# Patient Record
Sex: MALE | Race: WHITE | NOT HISPANIC OR LATINO | Employment: FULL TIME | URBAN - NONMETROPOLITAN AREA
[De-identification: names, ages, dates, MRNs, and addresses within clinical notes are randomized per-mention and may not be internally consistent; named-entity substitution may affect disease eponyms.]

---

## 2023-02-04 ENCOUNTER — APPOINTMENT (EMERGENCY)
Dept: CT IMAGING | Facility: HOSPITAL | Age: 58
End: 2023-02-04

## 2023-02-04 ENCOUNTER — HOSPITAL ENCOUNTER (INPATIENT)
Facility: HOSPITAL | Age: 58
LOS: 2 days | Discharge: HOME | End: 2023-02-06
Attending: EMERGENCY MEDICINE | Admitting: STUDENT IN AN ORGANIZED HEALTH CARE EDUCATION/TRAINING PROGRAM

## 2023-02-04 ENCOUNTER — APPOINTMENT (INPATIENT)
Dept: RADIOLOGY | Facility: HOSPITAL | Age: 58
End: 2023-02-04

## 2023-02-04 DIAGNOSIS — R29.90 STROKE-LIKE SYMPTOMS: ICD-10-CM

## 2023-02-04 DIAGNOSIS — R47.81 SLURRED SPEECH: Primary | ICD-10-CM

## 2023-02-04 PROBLEM — F19.90 DRUG USAGE: Status: ACTIVE | Noted: 2023-02-04

## 2023-02-04 PROBLEM — Z72.0 NICOTINE ABUSE: Status: ACTIVE | Noted: 2023-02-04

## 2023-02-04 PROBLEM — J96.01 ACUTE RESPIRATORY FAILURE WITH HYPOXIA (HCC): Status: ACTIVE | Noted: 2023-02-04

## 2023-02-04 PROBLEM — I72.9 PSEUDOANEURYSM (HCC): Status: ACTIVE | Noted: 2023-02-04

## 2023-02-04 LAB
2HR DELTA HS TROPONIN: 0 NG/L
4HR DELTA HS TROPONIN: -1 NG/L
AMPHETAMINES SERPL QL SCN: POSITIVE
ANION GAP SERPL CALCULATED.3IONS-SCNC: 4 MMOL/L (ref 4–13)
APTT PPP: 30 SECONDS (ref 23–37)
BARBITURATES UR QL: NEGATIVE
BENZODIAZ UR QL: NEGATIVE
BUN SERPL-MCNC: 19 MG/DL (ref 5–25)
CALCIUM SERPL-MCNC: 9.2 MG/DL (ref 8.4–10.2)
CARDIAC TROPONIN I PNL SERPL HS: 4 NG/L
CARDIAC TROPONIN I PNL SERPL HS: 5 NG/L
CARDIAC TROPONIN I PNL SERPL HS: 5 NG/L
CHLORIDE SERPL-SCNC: 101 MMOL/L (ref 96–108)
CO2 SERPL-SCNC: 31 MMOL/L (ref 21–32)
COCAINE UR QL: NEGATIVE
CREAT SERPL-MCNC: 0.97 MG/DL (ref 0.6–1.3)
ERYTHROCYTE [DISTWIDTH] IN BLOOD BY AUTOMATED COUNT: 14.5 % (ref 11.6–15.1)
ETHANOL SERPL-MCNC: <10 MG/DL
FLUAV RNA RESP QL NAA+PROBE: NEGATIVE
FLUBV RNA RESP QL NAA+PROBE: NEGATIVE
GFR SERPL CREATININE-BSD FRML MDRD: 87 ML/MIN/1.73SQ M
GLUCOSE SERPL-MCNC: 111 MG/DL (ref 65–140)
GLUCOSE SERPL-MCNC: 111 MG/DL (ref 65–140)
HCT VFR BLD AUTO: 44.5 % (ref 36.5–49.3)
HGB BLD-MCNC: 14.1 G/DL (ref 12–17)
INR PPP: 1.07 (ref 0.84–1.19)
MCH RBC QN AUTO: 29 PG (ref 26.8–34.3)
MCHC RBC AUTO-ENTMCNC: 31.7 G/DL (ref 31.4–37.4)
MCV RBC AUTO: 92 FL (ref 82–98)
METHADONE UR QL: NEGATIVE
OPIATES UR QL SCN: NEGATIVE
OXYCODONE+OXYMORPHONE UR QL SCN: NEGATIVE
PCP UR QL: NEGATIVE
PLATELET # BLD AUTO: 256 THOUSANDS/UL (ref 149–390)
PMV BLD AUTO: 9.7 FL (ref 8.9–12.7)
POTASSIUM SERPL-SCNC: 4.8 MMOL/L (ref 3.5–5.3)
PROTHROMBIN TIME: 14 SECONDS (ref 11.6–14.5)
RBC # BLD AUTO: 4.86 MILLION/UL (ref 3.88–5.62)
RSV RNA RESP QL NAA+PROBE: NEGATIVE
SARS-COV-2 RNA RESP QL NAA+PROBE: NEGATIVE
SODIUM SERPL-SCNC: 136 MMOL/L (ref 135–147)
THC UR QL: POSITIVE
TSH SERPL DL<=0.05 MIU/L-ACNC: 2.14 UIU/ML (ref 0.45–4.5)
WBC # BLD AUTO: 12.23 THOUSAND/UL (ref 4.31–10.16)

## 2023-02-04 RX ORDER — CLOPIDOGREL BISULFATE 75 MG/1
75 TABLET ORAL DAILY
Status: DISCONTINUED | OUTPATIENT
Start: 2023-02-05 | End: 2023-02-06 | Stop reason: HOSPADM

## 2023-02-04 RX ORDER — ASPIRIN 81 MG/1
324 TABLET, CHEWABLE ORAL ONCE
Status: COMPLETED | OUTPATIENT
Start: 2023-02-04 | End: 2023-02-04

## 2023-02-04 RX ORDER — CLOPIDOGREL BISULFATE 75 MG/1
75 TABLET ORAL DAILY
Status: DISCONTINUED | OUTPATIENT
Start: 2023-02-04 | End: 2023-02-04

## 2023-02-04 RX ORDER — LANOLIN ALCOHOL/MO/W.PET/CERES
3 CREAM (GRAM) TOPICAL
Status: DISCONTINUED | OUTPATIENT
Start: 2023-02-04 | End: 2023-02-06 | Stop reason: HOSPADM

## 2023-02-04 RX ORDER — NICOTINE 21 MG/24HR
1 PATCH, TRANSDERMAL 24 HOURS TRANSDERMAL DAILY
Status: DISCONTINUED | OUTPATIENT
Start: 2023-02-04 | End: 2023-02-06 | Stop reason: HOSPADM

## 2023-02-04 RX ORDER — ONDANSETRON 2 MG/ML
4 INJECTION INTRAMUSCULAR; INTRAVENOUS EVERY 6 HOURS PRN
Status: DISCONTINUED | OUTPATIENT
Start: 2023-02-04 | End: 2023-02-06 | Stop reason: HOSPADM

## 2023-02-04 RX ORDER — CALCIUM CARBONATE 200(500)MG
1000 TABLET,CHEWABLE ORAL DAILY PRN
Status: DISCONTINUED | OUTPATIENT
Start: 2023-02-04 | End: 2023-02-06 | Stop reason: HOSPADM

## 2023-02-04 RX ORDER — ACETAMINOPHEN 325 MG/1
650 TABLET ORAL EVERY 6 HOURS PRN
Status: DISCONTINUED | OUTPATIENT
Start: 2023-02-04 | End: 2023-02-06 | Stop reason: HOSPADM

## 2023-02-04 RX ORDER — DIPHENHYDRAMINE HCL 25 MG
25 TABLET ORAL EVERY 6 HOURS PRN
Status: DISCONTINUED | OUTPATIENT
Start: 2023-02-04 | End: 2023-02-06 | Stop reason: HOSPADM

## 2023-02-04 RX ORDER — POLYETHYLENE GLYCOL 3350 17 G/17G
17 POWDER, FOR SOLUTION ORAL DAILY PRN
Status: DISCONTINUED | OUTPATIENT
Start: 2023-02-04 | End: 2023-02-06 | Stop reason: HOSPADM

## 2023-02-04 RX ORDER — ATORVASTATIN CALCIUM 40 MG/1
40 TABLET, FILM COATED ORAL EVERY EVENING
Status: DISCONTINUED | OUTPATIENT
Start: 2023-02-04 | End: 2023-02-06 | Stop reason: HOSPADM

## 2023-02-04 RX ORDER — ENOXAPARIN SODIUM 100 MG/ML
40 INJECTION SUBCUTANEOUS DAILY
Status: DISCONTINUED | OUTPATIENT
Start: 2023-02-04 | End: 2023-02-06 | Stop reason: HOSPADM

## 2023-02-04 RX ORDER — ASPIRIN 81 MG/1
81 TABLET ORAL DAILY
Status: DISCONTINUED | OUTPATIENT
Start: 2023-02-05 | End: 2023-02-06 | Stop reason: HOSPADM

## 2023-02-04 RX ORDER — CLOPIDOGREL BISULFATE 75 MG/1
300 TABLET ORAL ONCE
Status: COMPLETED | OUTPATIENT
Start: 2023-02-04 | End: 2023-02-04

## 2023-02-04 RX ADMIN — ONDANSETRON 4 MG: 2 INJECTION INTRAMUSCULAR; INTRAVENOUS at 17:28

## 2023-02-04 RX ADMIN — Medication 3 MG: at 21:17

## 2023-02-04 RX ADMIN — ASPIRIN 81 MG 324 MG: 81 TABLET ORAL at 12:09

## 2023-02-04 RX ADMIN — NICOTINE 1 PATCH: 14 PATCH, EXTENDED RELEASE TRANSDERMAL at 15:03

## 2023-02-04 RX ADMIN — IOHEXOL 100 ML: 350 INJECTION, SOLUTION INTRAVENOUS at 11:41

## 2023-02-04 RX ADMIN — CLOPIDOGREL BISULFATE 300 MG: 75 TABLET ORAL at 12:08

## 2023-02-04 RX ADMIN — ENOXAPARIN SODIUM 40 MG: 40 INJECTION SUBCUTANEOUS at 15:01

## 2023-02-04 RX ADMIN — ATORVASTATIN CALCIUM 40 MG: 40 TABLET, FILM COATED ORAL at 17:12

## 2023-02-04 NOTE — QUICK NOTE
Stroke alert called: 1120 AM  Neurology response: 1120 AM  LKW: unclear time yesterday, patient collapsed per report that he relayed to ED physician on job site unclear etiology was taken to [facility] where he left overnight- ? 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

## 2023-02-04 NOTE — ASSESSMENT & PLAN NOTE
· Currently smokes quarter of a pack a day  · Patient would like nicotine patch while inpatient  · Counseled on smoking cessation, patient declines at this time

## 2023-02-04 NOTE — ASSESSMENT & PLAN NOTE
· Currently requiring 2 L nasal cannula  No oxygen at home  · Patient does have a history of smoking three quarters of a pack a day    · No history of COPD/emphysema  · Respiratory protocol  · Incentive spirometer  · Wean off oxygen as able keeping O2 sats above 89%  · Monitor

## 2023-02-04 NOTE — ED NOTES
Pt had complaints of nausea  Was given PRN zofran  PT also has complaints of palms "itching" stated that it happened in the past, but doesn't know why  No discoloration, rash or hives noted  Provider made aware   Lights dimmed for pt comfort, no s/s of distress, VS stable, PT A&Ox4     Elise Diaz, JESICA  02/04/23 5232

## 2023-02-04 NOTE — ASSESSMENT & PLAN NOTE
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 bulb injuring 8 mm in maximum diameter  This is of unclear etiology and may be related to a prior history of trauma  · CT brain: No acute intracranial abnormalities  · Ethanol normal  · Positive for THC and amphetamine/methamphetamine  Patient admits to smoking THC and recently using amphetamines  States that he only took them once  · Neurology consulted: MRI brain, permissive HTN upto 180 mm Hg given large pseudoaneurysm, DUAP load- ASA full dose and Plavix 300 x 1 followed by ASA 81 mg and plavix 75 mg x 21 days at least then should be able to switch to ASA only  Atorvastatin 40 mg daily  · MRI ordered  · Echo ordered  · TSH normal, LDL mildly elevated  · A1c pending  · Monitor on telemetry - sinus bradycardia  · PT/OT/speech   · Follow-up with vascular and neurology outpatient for pseudoaneurysm

## 2023-02-04 NOTE — ED PROVIDER NOTES
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  He reports that he apparently became unresponsive and his coworker perform CPR  He reports that he was taken to  where he was seen and evaluated  Admission was apparently recommended  Patient declined and signed out AMA  Wife reported patient's slurred speech was present when he came home but became more severe this morning and she brought him back to the emergency room for further evaluation  Patient is ambulatory in the exam room  History provided by:  Patient and spouse  STROKE Alert  Quality:  Slurred speech and difficulty ambulating  Severity:  Unable to specify  Onset quality:  Unable to specify  Timing:  Unable to specify  Progression:  Improving  Associated symptoms: congestion    Associated symptoms: no abdominal pain, no chest pain, no cough, no diarrhea, no ear pain, no fatigue, no fever, no headaches, no loss of consciousness, no myalgias, no nausea, no rash, no rhinorrhea, no shortness of breath, no sore throat, no vomiting and no wheezing        None       History reviewed  No pertinent past medical history  History reviewed  No pertinent surgical history  History reviewed  No pertinent family history  I have reviewed and agree with the history as documented      E-Cigarette/Vaping   • E-Cigarette Use Never User      E-Cigarette/Vaping Substances     Social History     Tobacco Use   • Smoking status: Every Day     Packs/day: 1 00     Types: Cigarettes   • Smokeless tobacco: Never   Vaping Use   • Vaping Use: Never used   Substance Use Topics • Alcohol use: Not Currently   • Drug use: Yes     Types: Marijuana       Review of Systems   Constitutional: Negative  Negative for activity change, fatigue and fever  HENT: Positive for congestion  Negative for ear pain, rhinorrhea, sinus pressure and sore throat  Eyes: Negative  Respiratory: Negative  Negative for cough, chest tightness, shortness of breath and wheezing  Cardiovascular: Negative  Negative for chest pain, palpitations and leg swelling  Gastrointestinal: Negative  Negative for abdominal pain, diarrhea, nausea and vomiting  Endocrine: Negative  Genitourinary: Negative  Negative for dysuria, flank pain and frequency  Musculoskeletal: Negative  Negative for arthralgias, back pain and myalgias  Skin: Negative  Negative for rash  Allergic/Immunologic: Negative  Neurological: Positive for dizziness and speech difficulty  Negative for tremors, seizures, loss of consciousness, syncope, weakness, light-headedness, numbness and headaches  Hematological: Negative  Psychiatric/Behavioral: Negative  All other systems reviewed and are negative  Physical Exam  Physical Exam  Vitals and nursing note reviewed  Constitutional:       Appearance: Normal appearance  He is well-developed  He is not ill-appearing or toxic-appearing  HENT:      Head: Normocephalic and atraumatic  Hair is normal       Jaw: No pain on movement  Right Ear: External ear normal       Left Ear: External ear normal       Nose: Nose normal  No congestion  Mouth/Throat:      Mouth: Mucous membranes are moist    Eyes:      General: Lids are normal  No scleral icterus  Extraocular Movements: Extraocular movements intact  Conjunctiva/sclera: Conjunctivae normal       Pupils: Pupils are equal, round, and reactive to light  Cardiovascular:      Rate and Rhythm: Normal rate and regular rhythm  Heart sounds: Normal heart sounds  No murmur heard    Pulmonary:      Effort: Pulmonary effort is normal  No respiratory distress  Breath sounds: Normal breath sounds  No decreased breath sounds, wheezing, rhonchi or rales  Abdominal:      General: Abdomen is flat  There is no distension  Palpations: Abdomen is soft  Abdomen is not rigid  Tenderness: There is no abdominal tenderness  There is no guarding or rebound  Musculoskeletal:         General: No swelling, tenderness, deformity or signs of injury  Normal range of motion  Cervical back: Normal range of motion and neck supple  Skin:     General: Skin is warm and dry  Coloration: Skin is not pale  Findings: No rash  Neurological:      General: No focal deficit present  Mental Status: He is alert and oriented to person, place, and time  Mental status is at baseline  Sensory: No sensory deficit  Motor: No weakness  Coordination: Coordination normal    Psychiatric:         Attention and Perception: Attention normal          Mood and Affect: Mood normal          Speech: Speech normal          Behavior: Behavior normal          Thought Content:  Thought content normal          Judgment: Judgment normal          Vital Signs  ED Triage Vitals [02/04/23 1118]   Temperature Pulse Respirations Blood Pressure SpO2   97 7 °F (36 5 °C) 78 20 140/77 95 %      Temp Source Heart Rate Source Patient Position - Orthostatic VS BP Location FiO2 (%)   Temporal Monitor Lying Left arm --      Pain Score       --           Vitals:    02/04/23 1120 02/04/23 1130 02/04/23 1145 02/04/23 1200   BP: 140/77 128/75 123/70 111/69   Pulse: 78 78 77 73   Patient Position - Orthostatic VS: Lying Lying           Visual Acuity  Visual Acuity    Flowsheet Row Most Recent Value   L Pupil Size (mm) 1   R Pupil Size (mm) 1          ED Medications  Medications   iohexol (OMNIPAQUE) 350 MG/ML injection (SINGLE-DOSE) 100 mL (100 mL Intravenous Given 2/4/23 1141)   aspirin chewable tablet 324 mg (324 mg Oral Given 2/4/23 1209) clopidogrel (PLAVIX) tablet 300 mg (300 mg Oral Given 2/4/23 1208)       Diagnostic Studies  Results Reviewed     Procedure Component Value Units Date/Time    HS Troponin I 2hr [[id number]]     Lab Status: No result Specimen: Blood     HS Troponin I 4hr [[id number]]     Lab Status: No result Specimen: Blood     HS Troponin 0hr (reflex protocol) [[id number]]  (Normal) Collected: 02/04/23 1130    Lab Status: Final result Specimen: Blood from Arm, Left Updated: 02/04/23 1204     hs TnI 0hr 5 ng/L     Ethanol [[id number]]  (Normal) Collected: 02/04/23 1142    Lab Status: Final result Specimen: Blood from Arm, Right Updated: 02/04/23 1203     Ethanol Lvl <10 mg/dL     Protime-INR [[id number]]  (Normal) Collected: 02/04/23 1130    Lab Status: Final result Specimen: Blood from Arm, Left Updated: 02/04/23 1153     Protime 14 0 seconds      INR 1 07    APTT [[id number]]  (Normal) Collected: 02/04/23 1130    Lab Status: Final result Specimen: Blood from Arm, Left Updated: 02/04/23 1153     PTT 30 seconds     FLU/RSV/COVID - if FLU/RSV clinically relevant [[id number]] Collected: 02/04/23 1142    Lab Status: In process Specimen: Nares from Nose Updated: 02/04/23 1147    CBC and Platelet [[id number]]  (Abnormal) Collected: 02/04/23 1130    Lab Status: Final result Specimen: Blood from Arm, Left Updated: 02/04/23 1139     WBC 12 23 Thousand/uL      RBC 4 86 Million/uL      Hemoglobin 14 1 g/dL      Hematocrit 44 5 %      MCV 92 fL      MCH 29 0 pg      MCHC 31 7 g/dL      RDW 14 5 %      Platelets 903 Thousands/uL      MPV 9 7 fL     Rapid drug screen, urine [[id number]]     Lab Status: No result Specimen: Urine     Basic metabolic panel [[id number]] Collected: 02/04/23 1130    Lab Status:  In process Specimen: Blood from Arm, Left Updated: 02/04/23 1137    Fingerstick Glucose (POCT) [[id number]]  (Normal) Collected: 02/04/23 1123    Lab Status: Final result Updated: 02/04/23 1126     POC Glucose 111 mg/dl                  CTA stroke alert (head/neck)   Final Result by Barry Cruz DO (02/04 1157)      No stenosis, occlusion or thrombosis of the cervical or intracranial vasculature  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 bulb injuring 8 mm in maximum diameter  This is of unclear etiology and may be related to a prior history of trauma  Findings were directly discussed with Raoul Coker  at 11:48 AM                            Workstation performed: [id number]         CT stroke alert brain   Final Result by Barry Cruz DO (02/04 1149)      No acute intracranial abnormality  Findings were directly discussed with Raoul Coker  at 11:48 AM       Workstation performed: [id number]                    Procedures  ECG 12 Lead Documentation Only    Date/Time: 2/4/2023 12:01 PM  Performed by: Tracey Gavin DO  Authorized by: Tracey Gavin DO     Indications / Diagnosis:  Chest pain  ECG reviewed by me, the ED Provider: yes    Patient location:  ED  Previous ECG:     Previous ECG:  Unavailable  Interpretation:     Interpretation: normal    Rate:     ECG rate assessment: normal    Rhythm:     Rhythm: sinus rhythm    Ectopy:     Ectopy: none    QRS:     QRS axis:  Normal  Conduction:     Conduction: normal    ST segments:     ST segments:  Normal  T waves:     T waves: normal               ED Course  ED Course as of 02/04/23 1217   Sat Feb 04, 2023   1130 Consulted with Dr Ligia Santoro (neurology)  No tpa at this time   1204 CTA shows "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 above the ICA bulb"  Not likely to be the cause of the patient's symptomatology  1204 CT head was negative  We will start antiplatelet dosing as recommended by neurology  Will refer for admission to hospitalist service  Case was discussed with them                    Stroke Assessment     Row Name 02/04/23 1132             NIH Stroke Scale    Interval Baseline      Level of Consciousness (1a ) 0      LOC Questions (1b ) 0      LOC Commands (1c ) 0      Best Gaze (2 ) 0      Visual (3 ) 0      Facial Palsy (4 ) 0      Motor Arm, Left (5a ) 0      Motor Arm, Right (5b ) 0      Motor Leg, Left (6a ) 0      Motor Leg, Right (6b ) 0      Limb Ataxia (7 ) 0      Sensory (8 ) 0      Best Language (9 ) 0      Dysarthria (10 ) 1      Extinction and Inattention (11 ) (Formerly Neglect) 0      Total 1              Flowsheet Row Most Recent Value   Thrombolytic Decision Options    Thrombolytic Decision Patient not a candidate  Patient is not a candidate options Unclear time of onset outside appropriate time window , Symptoms resolved/clearly non disabling  SBIRT 22yo+    Flowsheet Row Most Recent Value   SBIRT (23 yo +)    In order to provide better care to our patients, we are screening all of our patients for alcohol and drug use  Would it be okay to ask you these screening questions? Yes Filed at: 02/04/2023 1211   Initial Alcohol Screen: US AUDIT-C     1  How often do you have a drink containing alcohol? 0 Filed at: 02/04/2023 1211   2  How many drinks containing alcohol do you have on a typical day you are drinking? 0 Filed at: 02/04/2023 1211   3a  Male UNDER 65: How often do you have five or more drinks on one occasion? 0 Filed at: 02/04/2023 1211   3b  FEMALE Any Age, or MALE 65+: How often do you have 4 or more drinks on one occassion? 0 Filed at: 02/04/2023 1211   Audit-C Score 0 Filed at: 02/04/2023 1211   SHELLEY: How many times in the past year have you    Used an illegal drug or used a prescription medication for non-medical reasons? Never Filed at: 02/04/2023 1211                    Medical Decision Making  Patient presented to the emergency department and a MSE was performed  The patient was evaluated for complaint related to neurological deficit  Differential diagnoses included, but are not limited to, thrombotic stroke, embolic stroke, hemorrhagic stroke, intracranial tumor, peripheral nerve deficit, or complex migraine  Several of these diagnoses have been evaluated and ruled out by history and physical   As needed, patient will be further evaluated with laboratory and imaging studies  Higher level diagnostics, such as CT imaging or ultrasound, may also be required  Please see work-up portion of the note for further evaluation of patient's risk  Socioeconomic factors were also considered as part of the decision-making process  Unless otherwise stated in the chart or patient is admitted as elsewhere documented, any deviously prescribed medications will be maintained  Slurred speech: acute illness or injury with systemic symptoms that poses a threat to life or bodily functions  Amount and/or Complexity of Data Reviewed  Independent Historian: spouse     Details: Spouse provided additional history  Labs: ordered  Decision-making details documented in ED Course  Radiology: ordered  Decision-making details documented in ED Course  ECG/medicine tests: ordered  Decision-making details documented in ED Course  Discussion of management or test interpretation with external provider(s): Case discussed with neurologist on-call  Patient presented to the emergency department and a MSE was performed  The patient was evaluated and diagnosed with acute difficulty with speech and ambulation  This is a new issue that will require additional planned work-up and treatment in a hospitalized setting  As may have been required as part of this evaluation, clinical laboratory test, radiology imaging and medical testing (I e  EKG) were ordered as necessitated by the patient's presentation  I independently reviewed these studies, imaging and testing   This patient's case is considered to be a considerable risk secondary to the above listed disease process and poses a threat to the patient's well-being and baseline function  Further in-patient diagnostic testing and management, which may include the administration of parenteral medications, is required  Risk  Prescription drug management  Decision regarding hospitalization  Disposition  Final diagnoses:   Slurred speech     Time reflects when diagnosis was documented in both MDM as applicable and the Disposition within this note     Time User Action Codes Description Comment    2/4/2023 11:27 AM Marietta Doyle Add [R41 81] Slurred speech       ED Disposition     None      Follow-up Information    None         Patient's Medications    No medications on file       No discharge procedures on file      PDMP Review     None          ED Provider  Electronically Signed by           Bishop lIa DO  02/04/23 6131

## 2023-02-04 NOTE — ASSESSMENT & PLAN NOTE
· Patient test positive for THC and amphetamine/methamphetamine on urine drug test   Patient does admit to taking once recently, states that this was a "slip up "  Denies further drug use and using amphetamines more frequently  · Does admit to smoking THC  · Counseled on illicit drug cessation  Patient understands     · No withdrawal symptoms at this time  · Monitor for withdrawal symptoms

## 2023-02-04 NOTE — ASSESSMENT & PLAN NOTE
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

## 2023-02-04 NOTE — ASSESSMENT & PLAN NOTE
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 bulb injuring 8 mm in maximum diameter  This is of unclear etiology and may be related to a prior history of trauma  · CT brain: No acute intracranial abnormalities  · Ethanol normal  · Positive for THC and amphetamine/methamphetamine  Patient admits to smoking THC and recently using amphetamines  States that he only took them once  · Neurology consulted: MRI brain, permissive HTN upto 180 mm Hg given large pseudoaneurysm, DUAP load- ASA full dose and Plavix 300 x 1 followed by ASA 81 mg and plavix 75 mg x 21 days at least then should be able to switch to ASA only  Atorvastatin 40 mg daily  · MRI ordered  · Echo pending  · TSH, A1c, lipid panel pending  · Monitor on telemetry  · PT/OT/speech   · Follow-up with vascular and neurology outpatient for pseudoaneurysm

## 2023-02-04 NOTE — H&P
[address]  Progress Note - Fernie Myers 1965, 56 y o  male MRN: [id number]  Unit/Bed#: ED 08 Encounter: [id number]  Primary Care Provider: No primary care provider on file  Date and time admitted to hospital: 2/4/2023 11:13 AM     * Stroke-like symptoms  Assessment & Plan  • POA with complaints of slurred speech, off-balance, dizziness that started this morning  Patient states he feels "drunk" when he is walking  Wife states that patient had slurred speech yesterday, but today is more severe  • Patient reports he had "cardiac arrest" yesterday with CPR performed  Was taken via EMS to hospital patient signed out AMA due to not wanting to stay overnight  • NIHSS 1  • Status post Plavix 300 mg and  mg   • CTA head/neck: No stenosis, occlusion or thrombosis of the cervical or intracranial vasculature  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 bulb injuring 8 mm in maximum diameter   This is of unclear etiology and may be related to a prior history of trauma  • CT brain: No acute intracranial abnormalities  • Ethanol normal  • Positive for THC and amphetamine/methamphetamine  Patient admits to smoking THC and recently using amphetamines  States that he only took them once  • Neurology consulted: MRI brain, permissive HTN upto 180 mm Hg given large pseudoaneurysm, DUAP load- ASA full dose and Plavix 300 x 1 followed by ASA 81 mg and plavix 75 mg x 21 days at least then should be able to switch to ASA only   Atorvastatin 40 mg daily  • MRI ordered  • Echo pending  • TSH, A1c, lipid panel pending  • Monitor on telemetry  • PT/OT/speech   • Follow-up with vascular and neurology outpatient for pseudoaneurysm      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([facility] [address] )  Assessment & Plan  • CTA showing a 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 above the ICA bulb injuring 8 mm in maximum diameter  • Neurology recommending follow-up with neurovascular surgery outpatient      Drug usage  Assessment & Plan  • Patient test positive for THC and amphetamine/methamphetamine on urine drug test   Patient does admit to taking once recently, states that this was a "slip up "  Denies further drug use and using amphetamines more frequently  • Does admit to smoking THC  • Counseled on illicit drug cessation  Patient understands  • Monitor for withdrawal symptoms     Nicotine abuse  Assessment & Plan  • Currently smokes quarter of a pack a day  • Patient would like nicotine patch while inpatient  • Counseled on smoking cessation, patient declines at this time      Acute respiratory failure with hypoxia ([facility] [address] )  Assessment & Plan  • Currently requiring 2 L nasal cannula  No oxygen at home  • Patient does have a history of smoking three quarters of a pack a day  • No history of COPD/emphysema  • Respiratory protocol  • Incentive spirometer  • Wean off oxygen as able keeping O2 sats above 89%  • Monitor     VTE Pharmacologic Prophylaxis: VTE Score: 3 Moderate Risk (Score 3-4) - Pharmacological DVT Prophylaxis Ordered: enoxaparin (Lovenox)  Code Status: Level 1 - Full Code   Discussion with family: Updated  (wife) at bedside      Anticipated Length of Stay: Patient will be admitted on an inpatient basis with an anticipated length of stay of greater than 2 midnights secondary to Stroke work-up      Total Time for Visit, including Counseling / Coordination of Care: 60 minutes Greater than 50% of this total time spent on direct patient counseling and coordination of care      Chief Complaint: Dizziness, slurred speech, off-balance feeling     History of Present Illness:  Kimani Morales is a 56 y o  male with a PMH of nicotine abuse and drug usage who presents with dizziness, slurred speech, feeling off balance starting this morning    Patient states that when he woke up this morning he was feeling like he was "drunk" when he was walking  His wife states that last night when he returned home from work she noticed some slurring of his speech, today she felt the slurred speech was much worse and also thought that he sounded like he was drunk  He was also admitting to having some blurred vision when he was reading, this is new for him and he does not wear any glasses or have any previous issues with his vision  He states that he is not feeling dizzy or having slurred speech currently  He does admit to having some anxiety over his health while he is here  Patient denies shortness of breath, but is currently on 2 L nasal cannula  He does not wear oxygen at home  Also with mild chest discomfort upon palpation likely secondary to CPR being done yesterday  Had some nausea and 1 episode of vomiting this morning, currently without further nausea/vomiting  Patient does not see a PCP and has not been to the doctor in over 20 years  He does admit to smoking three quarters a pack of cigarettes a day  Patient comes to smoking THC and recently using amphetamine/methamphetamines  He states that this was a "slip up" and he does not usually use the amphetamine/methamphetamines  Patient does not have any history of alcohol usage       Of note, 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  Coworker needed to perform CPR on patient and EMS was called  Patient was taken to hospital where they recommended further work-up and staying overnight, patient signed out AMA as he did not want to stay overnight       Review of Systems:  Review of Systems   Constitutional: Negative for chills, diaphoresis, fatigue and fever  HENT: Positive for congestion (Admits to some mild congestion)  Negative for rhinorrhea, sinus pressure, sinus pain and sore throat  Eyes: Negative  Respiratory: Negative for cough, chest tightness, shortness of breath and wheezing  Cardiovascular: Positive for chest pain (From CPR yesterday only on palpation)  Negative for palpitations and leg swelling  Gastrointestinal: Positive for nausea and vomiting  Negative for abdominal distention, abdominal pain, constipation and diarrhea  Endocrine: Negative  Genitourinary: Negative  Musculoskeletal: Negative  Skin: Negative  Allergic/Immunologic: Negative  Neurological: Positive for dizziness, speech difficulty, weakness and light-headedness  Negative for seizures and headaches  Hematological: Negative  Psychiatric/Behavioral: Negative for agitation, behavioral problems, confusion and hallucinations  The patient is nervous/anxious           Past Medical and Surgical History:   Medical History   History reviewed  No pertinent past medical history         Surgical History   History reviewed  No pertinent surgical history         Meds/Allergies:  Prior to Admission medications    Not on File      I have reviewed home medications with patient personally      Allergies: No Known Allergies     Social History:  Marital Status:    Occupation:   Patient Pre-hospital Living Situation: Home  Patient Pre-hospital Level of Mobility: walks  Patient Pre-hospital Diet Restrictions: None  Substance Use History:   Social History          Substance and Sexual Activity   Alcohol Use Not Currently      Social History           Tobacco Use   Smoking Status Every Day   • Packs/day: 1 00   • Types: Cigarettes   Smokeless Tobacco Never      Social History           Substance and Sexual Activity   Drug Use Yes   • Types: Marijuana         Family History:  Family History   History reviewed   No pertinent family history         Physical Exam:      Vitals:   Blood Pressure: 141/78 (02/04/23 1430)  Pulse: 69 (02/04/23 1430)  Temperature: 97 7 °F (36 5 °C) (02/04/23 1400)  Temp Source: Oral (02/04/23 1400)  Respirations: 22 (02/04/23 1430)  Height: 5' 10" (177 8 cm) (02/04/23 1118)  Weight - Scale: 56 5 kg (124 lb 9 oz) (02/04/23 1118)  SpO2: 96 % (02/04/23 1430)     Physical Exam  Vitals reviewed  Constitutional:       General: He is not in acute distress  Appearance: He is not ill-appearing or toxic-appearing  HENT:      Head: Normocephalic and atraumatic  Nose: Nose normal       Mouth/Throat:      Mouth: Mucous membranes are moist    Eyes:      General: No visual field deficit  Pupils: Pupils are equal, round, and reactive to light  Cardiovascular:      Rate and Rhythm: Normal rate and regular rhythm  Heart sounds: Normal heart sounds  Pulmonary:      Effort: Pulmonary effort is normal       Breath sounds: Normal breath sounds  No wheezing  Comments: On 2 L nasal cannula  Chest:      Chest wall: Tenderness present  Abdominal:      General: Bowel sounds are normal  There is no distension  Palpations: Abdomen is soft  There is no mass  Tenderness: There is no abdominal tenderness  Musculoskeletal:         General: Normal range of motion  Right lower leg: No edema  Left lower leg: No edema  Skin:     General: Skin is warm and dry  Neurological:      General: No focal deficit present  Mental Status: He is alert and oriented to person, place, and time  Cranial Nerves: Cranial nerves 2-12 are intact  No cranial nerve deficit, dysarthria or facial asymmetry  Sensory: Sensation is intact  Motor: Motor function is intact  Coordination: Coordination is intact  Gait: Gait is intact     Psychiatric:         Mood and Affect: Mood normal          Behavior: Behavior normal             Additional Data:      Lab Results:       Results from last 7 days   Lab Units 02/04/23  1130   WBC Thousand/uL 12 23*   HEMOGLOBIN g/dL 14 1   HEMATOCRIT % 44 5   PLATELETS Thousands/uL 256           Results from last 7 days   Lab Units 02/04/23  1130   SODIUM mmol/L 136   POTASSIUM mmol/L 4 8   CHLORIDE mmol/L 101   CO2 mmol/L 31   BUN mg/dL 19 CREATININE mg/dL 0 97   ANION GAP mmol/L 4   CALCIUM mg/dL 9 2   GLUCOSE RANDOM mg/dL 111           Results from last 7 days   Lab Units 02/04/23  1130   INR   1 07           Results from last 7 days   Lab Units 02/04/23  1123   POC GLUCOSE mg/dl 111                 Lines/Drains:      Invasive Devices      None                            Imaging: Reviewed radiology reports from this admission including: Chest x-ray, CTA head and neck, CT brain  XR chest 1 view portable   Final Result by Tyson Gross MD (02/04 1426)       No acute findings                        Workstation performed: [id number]           CTA stroke alert (head/neck)   Final Result by Barry Mae DO (02/04 1157)       No stenosis, occlusion or thrombosis of the cervical or intracranial vasculature        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 bulb injuring 8 mm in maximum diameter  This is of unclear etiology and may be related to a prior history of trauma                Findings were directly discussed with Kaylin Pérez  at 11:48 AM                                    Workstation performed: [id number]           CT stroke alert brain   Final Result by Barry Mae DO (02/04 1149)       No acute intracranial abnormality        Findings were directly discussed with Kaylin Pérez  at 11:48 AM        Workstation performed: [id number]           MRI Inpatient Order    (Results Pending)         EKG and Other Studies Reviewed on Admission:   • EKG: NSR      ** Please Note: This note has been constructed using a voice recognition system   **

## 2023-02-04 NOTE — PROGRESS NOTES
[address]  Progress Note - Isabela Friends 1965, 56 y o  male MRN: [id number]  Unit/Bed#: ED 08 Encounter: [id number]  Primary Care Provider: No primary care provider on file  Date and time admitted to hospital: 2/4/2023 11:13 AM    * Stroke-like symptoms  Assessment & Plan  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 bulb injuring 8 mm in maximum diameter  This is of unclear etiology and may be related to a prior history of trauma  · CT brain: No acute intracranial abnormalities  · Ethanol normal  · Positive for THC and amphetamine/methamphetamine  Patient admits to smoking THC and recently using amphetamines  States that he only took them once  · Neurology consulted: MRI brain, permissive HTN upto 180 mm Hg given large pseudoaneurysm, DUAP load- ASA full dose and Plavix 300 x 1 followed by ASA 81 mg and plavix 75 mg x 21 days at least then should be able to switch to ASA only  Atorvastatin 40 mg daily  · MRI ordered  · Echo pending  · TSH, A1c, lipid panel pending  · Monitor on telemetry  · PT/OT/speech   · Follow-up with vascular and neurology outpatient for pseudoaneurysm      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([facility] [address] )  Assessment & Plan  · CTA showing a 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 above the ICA bulb injuring 8 mm in maximum diameter  · Neurology recommending follow-up with neurovascular surgery outpatient  Drug usage  Assessment & Plan  · Patient test positive for THC and amphetamine/methamphetamine on urine drug test   Patient does admit to taking once recently, states that this was a "slip up "  Denies further drug use and using amphetamines more frequently  · Does admit to smoking THC  · Counseled on illicit drug cessation  Patient understands  · Monitor for withdrawal symptoms    Nicotine abuse  Assessment & Plan  · Currently smokes quarter of a pack a day  · Patient would like nicotine patch while inpatient  · Counseled on smoking cessation, patient declines at this time  Acute respiratory failure with hypoxia ([facility])  Assessment & Plan  · Currently requiring 2 L nasal cannula  No oxygen at home  · Patient does have a history of smoking three quarters of a pack a day  · No history of COPD/emphysema  · Respiratory protocol  · Incentive spirometer  · Wean off oxygen as able keeping O2 sats above 89%  · Monitor    VTE Pharmacologic Prophylaxis: VTE Score: 3 Moderate Risk (Score 3-4) - Pharmacological DVT Prophylaxis Ordered: enoxaparin (Lovenox)  Code Status: Level 1 - Full Code   Discussion with family: Updated  (wife) at bedside  Anticipated Length of Stay: Patient will be admitted on an inpatient basis with an anticipated length of stay of greater than 2 midnights secondary to Stroke work-up  Total Time for Visit, including Counseling / Coordination of Care: 60 minutes Greater than 50% of this total time spent on direct patient counseling and coordination of care  Chief Complaint: Dizziness, slurred speech, off-balance feeling    History of Present Illness:  Rob Gregg is a 56 y o  male with a PMH of nicotine abuse and drug usage who presents with dizziness, slurred speech, feeling off balance starting this morning    Patient states that when he woke up this morning he was feeling like he was "drunk" when he was walking  His wife states that last night when he returned home from work she noticed some slurring of his speech, today she felt the slurred speech was much worse and also thought that he sounded like he was drunk  He was also admitting to having some blurred vision when he was reading, this is new for him and he does not wear any glasses or have any previous issues with his vision  He states that he is not feeling dizzy or having slurred speech currently  He does admit to having some anxiety over his health while he is here  Patient denies shortness of breath, but is currently on 2 L nasal cannula  He does not wear oxygen at home  Also with mild chest discomfort upon palpation likely secondary to CPR being done yesterday  Had some nausea and 1 episode of vomiting this morning, currently without further nausea/vomiting  Patient does not see a PCP and has not been to the doctor in over 20 years  He does admit to smoking three quarters a pack of cigarettes a day  Patient comes to smoking THC and recently using amphetamine/methamphetamines  He states that this was a "slip up" and he does not usually use the amphetamine/methamphetamines  Patient does not have any history of alcohol usage  Of note, 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  Coworker needed to perform CPR on patient and EMS was called  Patient was taken to hospital where they recommended further work-up and staying overnight, patient signed out AMA as he did not want to stay overnight  Review of Systems:  Review of Systems   Constitutional: Negative for chills, diaphoresis, fatigue and fever  HENT: Positive for congestion (Admits to some mild congestion)  Negative for rhinorrhea, sinus pressure, sinus pain and sore throat  Eyes: Negative  Respiratory: Negative for cough, chest tightness, shortness of breath and wheezing      Cardiovascular: Positive for chest pain (From CPR yesterday only on palpation)  Negative for palpitations and leg swelling  Gastrointestinal: Positive for nausea and vomiting  Negative for abdominal distention, abdominal pain, constipation and diarrhea  Endocrine: Negative  Genitourinary: Negative  Musculoskeletal: Negative  Skin: Negative  Allergic/Immunologic: Negative  Neurological: Positive for dizziness, speech difficulty, weakness and light-headedness  Negative for seizures and headaches  Hematological: Negative  Psychiatric/Behavioral: Negative for agitation, behavioral problems, confusion and hallucinations  The patient is nervous/anxious  Past Medical and Surgical History:   History reviewed  No pertinent past medical history  History reviewed  No pertinent surgical history  Meds/Allergies:  Prior to Admission medications    Not on File     I have reviewed home medications with patient personally  Allergies: No Known Allergies    Social History:  Marital Status:    Occupation:   Patient Pre-hospital Living Situation: Home  Patient Pre-hospital Level of Mobility: walks  Patient Pre-hospital Diet Restrictions: None  Substance Use History:   Social History     Substance and Sexual Activity   Alcohol Use Not Currently     Social History     Tobacco Use   Smoking Status Every Day   • Packs/day: 1 00   • Types: Cigarettes   Smokeless Tobacco Never     Social History     Substance and Sexual Activity   Drug Use Yes   • Types: Marijuana       Family History:  History reviewed  No pertinent family history  Physical Exam:     Vitals:   Blood Pressure: 141/78 (02/04/23 1430)  Pulse: 69 (02/04/23 1430)  Temperature: 97 7 °F (36 5 °C) (02/04/23 1400)  Temp Source: Oral (02/04/23 1400)  Respirations: 22 (02/04/23 1430)  Height: 5' 10" (177 8 cm) (02/04/23 1118)  Weight - Scale: 56 5 kg (124 lb 9 oz) (02/04/23 1118)  SpO2: 96 % (02/04/23 1430)    Physical Exam  Vitals reviewed  Constitutional:       General: He is not in acute distress  Appearance: He is not ill-appearing or toxic-appearing  HENT:      Head: Normocephalic and atraumatic  Nose: Nose normal       Mouth/Throat:      Mouth: Mucous membranes are moist    Eyes:      General: No visual field deficit  Pupils: Pupils are equal, round, and reactive to light  Cardiovascular:      Rate and Rhythm: Normal rate and regular rhythm  Heart sounds: Normal heart sounds  Pulmonary:      Effort: Pulmonary effort is normal       Breath sounds: Normal breath sounds  No wheezing  Comments: On 2 L nasal cannula  Chest:      Chest wall: Tenderness present  Abdominal:      General: Bowel sounds are normal  There is no distension  Palpations: Abdomen is soft  There is no mass  Tenderness: There is no abdominal tenderness  Musculoskeletal:         General: Normal range of motion  Right lower leg: No edema  Left lower leg: No edema  Skin:     General: Skin is warm and dry  Neurological:      General: No focal deficit present  Mental Status: He is alert and oriented to person, place, and time  Cranial Nerves: Cranial nerves 2-12 are intact  No cranial nerve deficit, dysarthria or facial asymmetry  Sensory: Sensation is intact  Motor: Motor function is intact  Coordination: Coordination is intact  Gait: Gait is intact     Psychiatric:         Mood and Affect: Mood normal          Behavior: Behavior normal           Additional Data:     Lab Results:  Results from last 7 days   Lab Units 02/04/23  1130   WBC Thousand/uL 12 23*   HEMOGLOBIN g/dL 14 1   HEMATOCRIT % 44 5   PLATELETS Thousands/uL 256     Results from last 7 days   Lab Units 02/04/23  1130   SODIUM mmol/L 136   POTASSIUM mmol/L 4 8   CHLORIDE mmol/L 101   CO2 mmol/L 31   BUN mg/dL 19   CREATININE mg/dL 0 97   ANION GAP mmol/L 4   CALCIUM mg/dL 9 2   GLUCOSE RANDOM mg/dL 111     Results from last 7 days Lab Units 02/04/23  1130   INR  1 07     Results from last 7 days   Lab Units 02/04/23  1123   POC GLUCOSE mg/dl 111               Lines/Drains:  Invasive Devices     None                     Imaging: Reviewed radiology reports from this admission including: Chest x-ray, CTA head and neck, CT brain  XR chest 1 view portable   Final Result by Brittany Holley MD (02/04 1426)      No acute findings  Workstation performed: [id number]         CTA stroke alert (head/neck)   Final Result by Gene Hipolito Severe, DO (02/04 1157)      No stenosis, occlusion or thrombosis of the cervical or intracranial vasculature  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 bulb injuring 8 mm in maximum diameter  This is of unclear etiology and may be related to a prior history of trauma  Findings were directly discussed with Huong Dunn  at 11:48 AM                            Workstation performed: [id number]         CT stroke alert brain   Final Result by Gene Hipolito Severe, DO (02/04 1149)      No acute intracranial abnormality  Findings were directly discussed with Huong Dunn  at 11:48 AM       Workstation performed: [id number]         MRI Inpatient Order    (Results Pending)       EKG and Other Studies Reviewed on Admission:   · EKG: NSR     ** Please Note: This note has been constructed using a voice recognition system   **

## 2023-02-05 PROBLEM — I46.9 CARDIAC ARREST (HCC): Status: ACTIVE | Noted: 2023-02-05

## 2023-02-05 LAB
ANION GAP SERPL CALCULATED.3IONS-SCNC: 1 MMOL/L (ref 4–13)
ATRIAL RATE: 73 BPM
BUN SERPL-MCNC: 17 MG/DL (ref 5–25)
CALCIUM SERPL-MCNC: 8.7 MG/DL (ref 8.4–10.2)
CHLORIDE SERPL-SCNC: 97 MMOL/L (ref 96–108)
CHOLEST SERPL-MCNC: 186 MG/DL
CO2 SERPL-SCNC: 36 MMOL/L (ref 21–32)
CREAT SERPL-MCNC: 0.89 MG/DL (ref 0.6–1.3)
ERYTHROCYTE [DISTWIDTH] IN BLOOD BY AUTOMATED COUNT: 14.3 % (ref 11.6–15.1)
EST. AVERAGE GLUCOSE BLD GHB EST-MCNC: 120 MG/DL
FLUAV RNA RESP QL NAA+PROBE: NEGATIVE
FLUBV RNA RESP QL NAA+PROBE: NEGATIVE
GFR SERPL CREATININE-BSD FRML MDRD: 96 ML/MIN/1.73SQ M
GLUCOSE SERPL-MCNC: 105 MG/DL (ref 65–140)
HBA1C MFR BLD: 5.8 %
HCT VFR BLD AUTO: 43.7 % (ref 36.5–49.3)
HDLC SERPL-MCNC: 55 MG/DL
HGB BLD-MCNC: 14 G/DL (ref 12–17)
LDLC SERPL CALC-MCNC: 118 MG/DL (ref 0–100)
MAGNESIUM SERPL-MCNC: 2.2 MG/DL (ref 1.9–2.7)
MCH RBC QN AUTO: 29.2 PG (ref 26.8–34.3)
MCHC RBC AUTO-ENTMCNC: 32 G/DL (ref 31.4–37.4)
MCV RBC AUTO: 91 FL (ref 82–98)
P AXIS: 75 DEGREES
PHOSPHATE SERPL-MCNC: 2.8 MG/DL (ref 2.7–4.5)
PLATELET # BLD AUTO: 240 THOUSANDS/UL (ref 149–390)
PMV BLD AUTO: 9.5 FL (ref 8.9–12.7)
POTASSIUM SERPL-SCNC: 4.6 MMOL/L (ref 3.5–5.3)
PR INTERVAL: 150 MS
QRS AXIS: 78 DEGREES
QRSD INTERVAL: 82 MS
QT INTERVAL: 406 MS
QTC INTERVAL: 447 MS
RBC # BLD AUTO: 4.8 MILLION/UL (ref 3.88–5.62)
RSV RNA RESP QL NAA+PROBE: NEGATIVE
SARS-COV-2 RNA RESP QL NAA+PROBE: NEGATIVE
SODIUM SERPL-SCNC: 134 MMOL/L (ref 135–147)
T WAVE AXIS: 78 DEGREES
TRIGL SERPL-MCNC: 64 MG/DL
VENTRICULAR RATE: 73 BPM
WBC # BLD AUTO: 9.41 THOUSAND/UL (ref 4.31–10.16)

## 2023-02-05 RX ORDER — KETOROLAC TROMETHAMINE 30 MG/ML
30 INJECTION, SOLUTION INTRAMUSCULAR; INTRAVENOUS ONCE
Status: DISCONTINUED | OUTPATIENT
Start: 2023-02-05 | End: 2023-02-05

## 2023-02-05 RX ORDER — METOCLOPRAMIDE HYDROCHLORIDE 5 MG/ML
10 INJECTION INTRAMUSCULAR; INTRAVENOUS ONCE
Status: DISCONTINUED | OUTPATIENT
Start: 2023-02-05 | End: 2023-02-05

## 2023-02-05 RX ADMIN — ENOXAPARIN SODIUM 40 MG: 40 INJECTION SUBCUTANEOUS at 13:10

## 2023-02-05 RX ADMIN — NICOTINE 1 PATCH: 14 PATCH, EXTENDED RELEASE TRANSDERMAL at 13:10

## 2023-02-05 RX ADMIN — Medication 3 MG: at 20:39

## 2023-02-05 RX ADMIN — ATORVASTATIN CALCIUM 40 MG: 40 TABLET, FILM COATED ORAL at 17:04

## 2023-02-05 RX ADMIN — CLOPIDOGREL 75 MG: 75 TABLET, FILM COATED ORAL at 08:21

## 2023-02-05 RX ADMIN — ASPIRIN 81 MG: 81 TABLET ORAL at 08:21

## 2023-02-05 RX ADMIN — ACETAMINOPHEN 325MG 650 MG: 325 TABLET ORAL at 08:21

## 2023-02-05 NOTE — PHYSICAL THERAPY NOTE
PHYSICAL THERAPY EVALUATION  NAME:  Meghana Negrete  DATE: 02/05/23    AGE:   56 y o  Mrn:   [id number]  ADMIT DX:  Dizziness [R42]    History reviewed  No pertinent past medical history  Length Of Stay: 1  Performed at least 2 patient identifiers during session: Name and Birthday  PHYSICAL THERAPY EVALUATION :    02/05/23 1111   PT Last Visit   PT Visit Date 02/05/23   Note Type   Note type Evaluation   Pain Assessment   Pain Assessment Tool 0-10   Pain Score No Pain   Restrictions/Precautions   Other Precautions Telemetry;Multiple lines;O2  (2 LPM>RA)   Home Living   Type of Home House  (2 RISHI  no HR ( front), 3 RISHI with 1 HR (back))   Home Layout Multi-level;Stairs to enter with rails;Stairs to enter without rails;Bed/bath upstairs  ([place] style)   Bathroom Shower/Tub Tub/shower unit   Bathroom Toilet Standard   Home Equipment Cane  (QC)   Additional Comments Patient lives in a [place] style home  Typically enters through the back with 3 RISHI and 1 HR to the basement level  Patient then has a FF with B/L HR to the main living area where B and B are located  Patient has another FF to a second floor but reports that they don't use it  Patient did not use any AD PTA  Patient owns a QC which he used temporarily following R knee meniscal tear  Prior Function   Level of [place] Independent with ADLs; Independent with functional mobility; Independent with IADLS   Lives With Spouse   Receives Help From Family   IADLs Independent with driving; Independent with meal prep   Falls in the last 6 months 0   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)   Comments Patient was I w/o AD for all mobility and ADL tasks PTA  Patient was able to complete IADL's but wife typically performs  +drives  and is employed FT as a higuera     General   Additional Pertinent History Patient experienced an unresponsive episode for which he was taken to another  ER and signed out AMA within the last week, per his reports   Cognition Overall Cognitive Status WFL   Arousal/Participation Responsive   Attention Within functional limits   Orientation Level Oriented X4   Memory Within functional limits   Following Commands Follows all commands and directions without difficulty   RLE Assessment   RLE Assessment WFL   LLE Assessment   LLE Assessment WFL   Bed Mobility   Additional Comments Patient seated upright in bed at start of session  D/T same, BM N/A   Transfers   Sit to Stand 7  Independent   Stand to Sit 7  Independent   Stand pivot 7  Independent   Additional Comments Patient completed all functional transfers without Ad in room and hallway  No LOB noted  Patient on 2 LPM at start of session  Trialed on RA with patient maintaining good sat's at 91% and above post activity  Patient seated on bed at end of session with all needs met  Ambulation/Elevation   Gait pattern Decreased heel strike;Decreased hip extension   Gait Assistance 7  Independent   Assistive Device None   Distance Patient ambulated > 400' without Ad including turns, directional changes with no LOB  Patient on RA with SPo2 > 91%   Stair Management Assistance 5  Supervision   Stair Management Technique One rail R;Alternating pattern   Number of Stairs 13   Balance   Static Sitting Normal   Dynamic Sitting Normal   Static Standing Normal   Dynamic Standing Normal   Ambulatory Good   Endurance Deficit   Endurance Deficit No  (Patient on 2 LPm at start of session  Trialed on RA for all mobility including ambulation and staire    Patient maintained SPO2 at 91% and above on RA t/o, although was minimally SOB post activity)   Activity Tolerance   Activity Tolerance Patient tolerated treatment well   Medical Staff Made Aware Spoke with EDGARD Burdick with [facility]   Assessment   Prognosis Good   Plan   Treatment/Interventions   (D/C patient)   PT Frequency Other (Comment)  (Eval only)   Recommendation   PT Discharge Recommendation No rehabilitation needs Additional Comments No equipment needs   AM-PAC Basic Mobility Inpatient   Turning in Flat Bed Without Bedrails 4   Lying on Back to Sitting on Edge of Flat Bed Without Bedrails 4   Moving Bed to Chair 4   Standing Up From Chair Using Arms 4   Walk in Room 4   Climb 3-5 Stairs With Railing 3   Basic Mobility Inpatient Raw Score 23   Basic Mobility Standardized Score 50 88   Highest Level Of Mobility   JH-HLM Goal 7: Walk 25 feet or more   JH-HLM Achieved 8: Walk 250 feet ot more   End of Consult   Patient Position at End of Consult Seated edge of bed; All needs within reach     (Please find full objective findings from PT assessment regarding body systems outlined above)  Assessment: Pt is a 56 y o  male seen for PT evaluation s/p admission to [address] [place] [address] on 2/4/2023 with Stroke-like symptoms  Order placed for PT services  Upon evaluation: Pt is presenting with impaired functional mobility due to decreased strength, decreased endurance, gait deviations, and impaired judgment requiring  S for stairs    Pt's clinical presentation is currently unstable/unpredictable given the functional mobility deficits above, especially weakness, gait deviations, decreased functional mobility tolerance, and impaired judgement, coupled with fall risks as indicated by AM-PAC 6-Clicks: 27/23 as well as impaired judgement and combined with medical complications of abnormal WBCs, abnormal sodium values, low SpO2 values, new onset O2 use, abnormal CO2 values, and need for input for mobility technique/safety  Pt's PMHx and comorbidities that may affect physical performance and progress include:  pseudoaneurysm, drug use, nicotine abuse, ARF with hypoxia   Personal factors affecting pt at time of IE include: anxiety, behavioral pattern, inability to perform current job functions, tobacco use, and drug use    Pt will benefit from continued skilled PT services to address deficits as defined above and to maximize level of functional mobility to facilitate return toward PLOF and improved QOL  From PT/mobility standpoint, recommendation at time of d/c would be home independently w/ no skilled acute PT needs pending progress in order to reduce fall risk and maximize pt's functional independence and consistency with mobility in order to facilitate return to PLOF  The patient's AM-PAC Basic Mobility Inpatient Short Form Raw Score is 23  A Raw score of greater than 16 suggests the patient may benefit from discharge to home  Please also refer to the recommendation of the Physical Therapist for safe discharge planning  Patient demonstrates I without AD for all functional mobility including transfers, ambulation and stairs  Patient reports being at baseline level with no concerns regarding functional mobility nor ADL's/IADL's  No additional skilled PT needs in this setting at this time                Julia Rocha, PT,MSPT

## 2023-02-05 NOTE — ASSESSMENT & PLAN NOTE
· was noted to pass out at work day PTA and co-worker had to do CPR on him 2 times for his "heart stopping"  · EKG with no signs of ischemia  · Trop wnl  · no h/o sudden cardiac death or early MI in family   · Echo ordered  · patient uses methamphetamine (states that this time it was just a "slip-up") but unclear of how much he really uses; UDS was positive   Heavy meth use can contribute to sudden cardiac arrest and is a possibility given positive UDS  · Monitor on telemetry

## 2023-02-05 NOTE — PROGRESS NOTES
[address]  Progress Note - Nelwyn Mems 1965, 56 y o  male MRN: [id number]  Unit/Bed#: ED 08 Encounter: [id number]  Primary Care Provider: No primary care provider on file  Date and time admitted to hospital: 2/4/2023 11:13 AM    * Stroke-like symptoms  Assessment & Plan  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 bulb injuring 8 mm in maximum diameter  This is of unclear etiology and may be related to a prior history of trauma  · CT brain: No acute intracranial abnormalities  · Ethanol normal  · Positive for THC and amphetamine/methamphetamine  Patient admits to smoking THC and recently using amphetamines  States that he only took them once  · Neurology consulted: MRI brain, permissive HTN upto 180 mm Hg given large pseudoaneurysm, DUAP load- ASA full dose and Plavix 300 x 1 followed by ASA 81 mg and plavix 75 mg x 21 days at least then should be able to switch to ASA only  Atorvastatin 40 mg daily  · MRI ordered  · Echo ordered  · TSH normal, LDL mildly elevated  · A1c pending  · Monitor on telemetry - sinus bradycardia  · PT/OT/speech   · Follow-up with vascular and neurology outpatient for pseudoaneurysm  Assessment & Plan  · POA with complaints of slurred speech, off-balance, dizziness that started this morning    Patient states he feels "drunk" when he is walking  Wife states that patient had slurred speech yesterday, but today is more severe  · Patient reports he had "cardiac arrest" yesterday with CPR performed  Was taken via EMS to hospital patient signed out AMA due to not wanting to stay overnight  · NIHSS 1  · Status post Plavix 300 mg and  mg   · CTA head/neck: No stenosis, occlusion or thrombosis of the cervical or intracranial vasculature  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 bulb injuring 8 mm in maximum diameter  This is of unclear etiology and may be related to a prior history of trauma  · CT brain: No acute intracranial abnormalities  · Ethanol normal  · Positive for THC and amphetamine/methamphetamine  Patient admits to smoking THC and recently using amphetamines  States that he only took them once  · Neurology consulted: MRI brain, permissive HTN upto 180 mm Hg given large pseudoaneurysm, DUAP load- ASA full dose and Plavix 300 x 1 followed by ASA 81 mg and plavix 75 mg x 21 days at least then should be able to switch to ASA only  Atorvastatin 40 mg daily  · MRI ordered  · Echo pending  · TSH, A1c, lipid panel pending  · Monitor on telemetry  · PT/OT/speech   · Follow-up with vascular and neurology outpatient for pseudoaneurysm  Cardiac arrest [facility])  Assessment & Plan  · was noted to pass out at work day PTA and co-worker had to do CPR on him 2 times for his "heart stopping"  · EKG with no signs of ischemia  · Trop wnl  · no h/o sudden cardiac death or early MI in family   · Echo ordered  · patient uses methamphetamine (states that this time it was just a "slip-up") but unclear of how much he really uses; UDS was positive   Heavy meth use can contribute to sudden cardiac arrest and is a possibility given positive UDS  · Monitor on telemetry    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[facility])  Assessment & Plan  · CTA showing a 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 above the ICA bulb injuring 8 mm in maximum diameter  · Neurology recommending follow-up with neurovascular surgery outpatient  Drug usage  Assessment & Plan  · Patient test positive for THC and amphetamine/methamphetamine on urine drug test   Patient does admit to taking once recently, states that this was a "slip up "  Denies further drug use and using amphetamines more frequently  · Does admit to smoking THC  · Counseled on illicit drug cessation  Patient understands  · No withdrawal symptoms at this time  · Monitor for withdrawal symptoms    Nicotine abuse  Assessment & Plan  · Currently smokes quarter of a pack a day  · Patient would like nicotine patch while inpatient  · Counseled on smoking cessation, patient declines at this time  Assessment & Plan  · Currently smokes quarter of a pack a day  · Patient would like nicotine patch while inpatient  · Counseled on smoking cessation, patient declines at this time  Acute respiratory failure with hypoxia (HCC)  Assessment & Plan  · Currently requiring 2 L nasal cannula  No oxygen at home  · Patient does have a history of smoking three quarters of a pack a day  · No history of COPD/emphysema  · Respiratory protocol  · Incentive spirometer  · Wean off oxygen as able keeping O2 sats above 89%  · Monitor    Assessment & Plan  · Currently requiring 2 L nasal cannula  No oxygen at home  · Patient does have a history of smoking three quarters of a pack a day  · No history of COPD/emphysema  · Respiratory protocol  · Incentive spirometer  · Wean off oxygen as able keeping O2 sats above 89%  · Monitor    VTE Pharmacologic Prophylaxis: VTE Score: 3 Moderate Risk (Score 3-4) - Pharmacological DVT Prophylaxis Ordered: enoxaparin (Lovenox)  Patient Centered Rounds: I performed bedside rounds with nursing staff today    Discussions with Specialists or Other Care Team Provider: Nursing and case management    Education and Discussions with Family / Patient: Attempted to update  (significant other) via phone  Unable to contact  Time Spent for Care: 30 minutes  More than 50% of total time spent on counseling and coordination of care as described above  Current Length of Stay: 1 day(s)  Current Patient Status: Inpatient   Certification Statement: The patient will continue to require additional inpatient hospital stay due to Stroke work-up  Discharge Plan: Anticipate discharge in 24-48 hrs to home  Code Status: Level 1 - Full Code    Subjective:   Patient seen and examined at bedside this morning  He admits to having a headache  Denies any other acute complaints  No longer having blurred vision or dizziness  Denies chest pain, shortness of breath, fever, chills, nausea, vomiting, abdominal pain  Objective:     Vitals:   Temp (24hrs), Av 8 °F (36 6 °C), Min:97 7 °F (36 5 °C), Max:98 2 °F (36 8 °C)    Temp:  [97 7 °F (36 5 °C)-98 2 °F (36 8 °C)] 98 2 °F (36 8 °C)  HR:  [56-77] 63  Resp:  [8-27] 17  BP: (111-142)/(58-87) 136/77  SpO2:  [91 %-99 %] 98 %  Body mass index is 17 87 kg/m²  Input and Output Summary (last 24 hours):   No intake or output data in the 24 hours ending 23 1233    Physical Exam:   Physical Exam  Vitals reviewed  Constitutional:       General: He is not in acute distress  Appearance: He is not ill-appearing or toxic-appearing  HENT:      Head: Normocephalic and atraumatic  Nose: Nose normal       Mouth/Throat:      Mouth: Mucous membranes are moist    Eyes:      Pupils: Pupils are equal, round, and reactive to light  Cardiovascular:      Rate and Rhythm: Normal rate and regular rhythm  Heart sounds: Normal heart sounds  Pulmonary:      Effort: No respiratory distress  Breath sounds: No wheezing  Comments: Discussed sounds bilaterally  Abdominal:      General: Bowel sounds are normal  There is no distension  Palpations: Abdomen is soft  There is no mass  Tenderness: There is no abdominal tenderness  Musculoskeletal:         General: Normal range of motion  Right lower leg: No edema  Left lower leg: No edema  Skin:     General: Skin is warm and dry  Neurological:      General: No focal deficit present  Mental Status: He is alert and oriented to person, place, and time  Psychiatric:         Mood and Affect: Mood normal          Behavior: Behavior normal           Additional Data:     Labs:  Results from last 7 days   Lab Units 02/05/23  0444   WBC Thousand/uL 9 41   HEMOGLOBIN g/dL 14 0   HEMATOCRIT % 43 7   PLATELETS Thousands/uL 240     Results from last 7 days   Lab Units 02/05/23  0444   SODIUM mmol/L 134*   POTASSIUM mmol/L 4 6   CHLORIDE mmol/L 97   CO2 mmol/L 36*   BUN mg/dL 17   CREATININE mg/dL 0 89   ANION GAP mmol/L 1*   CALCIUM mg/dL 8 7   GLUCOSE RANDOM mg/dL 105     Results from last 7 days   Lab Units 02/04/23  1130   INR  1 07     Results from last 7 days   Lab Units 02/04/23  1123   POC GLUCOSE mg/dl 111               Lines/Drains:  Invasive Devices     Peripheral Intravenous Line  Duration           Peripheral IV 02/04/23 Right Antecubital 1 day                  Telemetry:  Telemetry Orders (From admission, onward)             48 Hour Telemetry Monitoring  Continuous x 48 hours        References:    Telemetry Guidelines   Question:  Reason for 48 Hour Telemetry  Answer:  Acute CVA (<24 hrs old, hemispheric strokes, selected brainstem strokes, cardiac arrhythmias)                 Telemetry Reviewed: Sinus Bradycardia  Indication for Continued Telemetry Use: No indication for continued use  Will discontinue  Imaging: No pertinent imaging reviewed      Recent Cultures (last 7 days):         Last 24 Hours Medication List:   Current Facility-Administered Medications   Medication Dose Route Frequency Provider Last Rate   • acetaminophen  650 mg Oral Q6H PRN Umberto Lefort MIREYA Lunsford     • aspirin  81 mg Oral Daily [facility] Franco Calderón PA-C     • atorvastatin  40 mg Oral QPM Jennifer Arambula PA-C     • calcium carbonate  1,000 mg Oral Daily PRN Jennifer Arambula PA-C     • clopidogrel  75 mg Oral Daily Jennifer Arambula PA-C     • diphenhydrAMINE  25 mg Oral Q6H PRN Karla Koch MD     • enoxaparin  40 mg Subcutaneous Daily [facility] Franco Calderón PA-C     • ketorolac  30 mg Intravenous Once Rui Calderón PA-C     • melatonin  3 mg Oral HS Karla Koch MD     • metoclopramide  10 mg Intravenous Once Jennifer Arambula PA-C     • nicotine  1 patch Transdermal Daily [facility] Franco Lunsford PA-C     • ondansetron  4 mg Intravenous Q6H PRN Jennifer Arambula PA-C     • polyethylene glycol  17 g Oral Daily PRN Jennifer Arambula PA-C          Today, Patient Was Seen By: Jennifer Arambula PA-C    **Please Note: This note may have been constructed using a voice recognition system  **

## 2023-02-05 NOTE — OCCUPATIONAL THERAPY NOTE
Occupational Therapy Evaluation     Patient Name: Liliana SALGADO Date: 2/5/2023  Problem List  Principal Problem:    Stroke-like symptoms  Active Problems:    Acute respiratory failure with hypoxia (HCC)    Nicotine abuse    Drug usage    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([address] )    Past Medical History  History reviewed  No pertinent past medical history  Past Surgical History  History reviewed  No pertinent surgical history  02/05/23 1110   Note Type   Note type Evaluation   Pain Assessment   Pain Assessment Tool 0-10   Pain Score No Pain   Restrictions/Precautions   Other Precautions O2;Telemetry;Multiple lines  (2L -> RA)   Home Living   Type of Home House  (2 RISHI no HR (front) or 3 RISHI one HR (back))   Home Layout Multi-level;Bed/bath upstairs   Bathroom Shower/Tub Tub/shower unit   Bathroom Toilet Standard   Home Equipment Cane   Additional Comments Pt reports living with spouse in a [facility]  No AD use PTA  Prior Function   Level of [place] Independent with ADLs; Independent with functional mobility; Independent with IADLS   Lives With Spouse   Receives Help From Family   IADLs Independent with driving; Independent with meal prep; Independent with medication management   Falls in the last 6 months 0   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)   ADL   UB Dressing Assistance 7  Independent   LB Dressing Assistance 7  Independent   Additional Comments Pt able to don hospital gown and sweatpants while seated in bed and don shoes while standing @ I    Bed Mobility   Additional Comments Pt seated upright in bed at beginning of session  Bed mobility not assessed at this time  Transfers   Sit to Stand 7  Independent   Stand to Sit 7  Independent   Stand pivot 7  Independent   Additional Comments STS from EOB with no AD @ I  Pt completing functional mobility in room and hallway with no AD @ I  Pt on 2lpm O2 at beginning of session  Trialed on RA, pt maintained O2 sats within the 90's throughout   Pt seated in bed at end of session with all needs met  Balance   Static Sitting Normal   Dynamic Sitting Normal   Static Standing Normal   Dynamic Standing Normal   Activity Tolerance   Activity Tolerance Patient tolerated treatment well   Medical Staff Made Aware Spoke with PT Saida   Nurse Made Aware Spoke with RN Jimmy Giles Assessment   RUE Assessment WFL   LUE Assessment   LUE Assessment WFL   Hand Function   Gross Motor Coordination Functional   Fine Motor Coordination Functional   Cognition   Overall Cognitive Status WFL   Arousal/Participation Alert; Responsive; Cooperative   Attention Within functional limits   Orientation Level Oriented X4   Memory Within functional limits   Following Commands Follows all commands and directions without difficulty   Assessment   Prognosis Good   Assessment Pt is a 56 y o  male, admitted to [address] [place] [address] 2/4/2023 d/t experiencing slurred speech after an unresponsive episode at work  Dx: stroke-like symptoms  No pertinent PMHx impacting their performance during ADL tasks  Prior to admission to the hospital Pt was performing ADLs without physical assistance  IADLs without physical assistance  Functional transfers/ambulation without physical assistance  Cognitive status was PTA was Intact  OT order placed to assess Pt's ADLs, cognitive status, and performance during functional tasks in order to maximize safety and independence while making most appropriate d/c recommendations  PT/OT co-evaluation completed at this time d/t safety concerns  Pt's clinical presentation is currently evolving given new onset deficits that effect Pt's occupational performance and ability to safely return to OF including decreased endurance combined with medical complications of abnormal sodium values, low SpO2 values, new onset O2 use and abnormal CO2 values  Pt on 2lpm at beginning of session, trialed on RA and maintained Good O2 sats throughout   Personal factors affecting Pt at time of initial evaluation include: step(s) to enter environment, multi-level environment and questionable non-compliance  Pt reporting they are near their baseline function and have no concerns regarding ADLs, IADLs or functional mobility upon return to home, therefore do not require acute OT services at this time  D/C OT effective 2/5/23  If new concerns arise, please re-consult  Plan   OT Frequency Eval only   Recommendation   OT Discharge Recommendation No rehabilitation needs   AM-PAC Daily Activity Inpatient   Lower Body Dressing 4   Bathing 4   Toileting 4   Upper Body Dressing 4   Grooming 4   Eating 4   Daily Activity Raw Score 24   Daily Activity Standardized Score (Calc for Raw Score >=11) 57 54   AM-PAC Applied Cognition Inpatient   Following a Speech/Presentation 4   Understanding Ordinary Conversation 4   Taking Medications 4   Remembering Where Things Are Placed or Put Away 4   Remembering List of 4-5 Errands 4   Taking Care of Complicated Tasks 4   Applied Cognition Raw Score 24   Applied Cognition Standardized Score 62 21     The patient's raw score on the AM-PAC Daily Activity Inpatient Short Form is 24  A raw score of greater than or equal to 19 suggests the patient may benefit from discharge to home  Pt reporting they are near their baseline function and have no concerns regarding ADLs, IADLs or functional mobility upon return to home, therefore do not require acute OT services at this time  D/C OT effective 2/5/23  If new concerns arise, please re-consult      CL Amin/JAMES

## 2023-02-05 NOTE — PLAN OF CARE
Problem: PHYSICAL THERAPY ADULT  Goal: Performs mobility at highest level of function for planned discharge setting  See evaluation for individualized goals  Description: Treatment/Interventions:  (D/C patient)          See flowsheet documentation for full assessment, interventions and recommendations  Outcome: Completed  Note: Prognosis: Good     Assessment: Pt is a 56 y o  male seen for PT evaluation s/p admission to [address] [place] [address] on 2/4/2023 with Stroke-like symptoms  Order placed for PT services  Upon evaluation: Pt is presenting with impaired functional mobility due to decreased strength, decreased endurance, gait deviations, and impaired judgment requiring  S for stairs    Pt's clinical presentation is currently unstable/unpredictable given the functional mobility deficits above, especially weakness, gait deviations, decreased functional mobility tolerance, and impaired judgement, coupled with fall risks as indicated by AM-PAC 6-Clicks: 76/65 as well as impaired judgement and combined with medical complications of abnormal WBCs, abnormal sodium values, low SpO2 values, new onset O2 use, abnormal CO2 values, and need for input for mobility technique/safety  Pt's PMHx and comorbidities that may affect physical performance and progress include:  pseudoaneurysm, drug use, nicotine abuse, ARF with hypoxia   Personal factors affecting pt at time of IE include: anxiety, behavioral pattern, inability to perform current job functions, tobacco use, and drug use   Pt will benefit from continued skilled PT services to address deficits as defined above and to maximize level of functional mobility to facilitate return toward PLOF and improved QOL   From PT/mobility standpoint, recommendation at time of d/c would be home independently w/ no skilled acute PT needs pending progress in order to reduce fall risk and maximize pt's functional independence and consistency with mobility in order to facilitate return to PLOF  The patient's AM-PAC Basic Mobility Inpatient Short Form Raw Score is 23  A Raw score of greater than 16 suggests the patient may benefit from discharge to home  Please also refer to the recommendation of the Physical Therapist for safe discharge planning  Patient demonstrates I without AD for all functional mobility including transfers, ambulation and stairs  Patient reports being at baseline level with no concerns regarding functional mobility nor ADL's/IADL's  No additional skilled PT needs in this setting at this time  PT Discharge Recommendation: No rehabilitation needs    See flowsheet documentation for full assessment

## 2023-02-05 NOTE — UTILIZATION REVIEW
Initial Clinical Review    Admission: Date/Time/Statement:   Admission Orders (From admission, onward)     Ordered        02/04/23 1248  INPATIENT ADMISSION  Once                      Orders Placed This Encounter   Procedures   • INPATIENT ADMISSION     Standing Status:   Standing     Number of Occurrences:   1     Order Specific Question:   Level of Care     Answer:   Med Surg [16]     Order Specific Question:   Estimated length of stay     Answer:   More than 2 Midnights     Order Specific Question:   Certification     Answer:   I certify that inpatient services are medically necessary for this patient for a duration of greater than two midnights  See H&P and MD Progress Notes for additional information about the patient's course of treatment  ED Arrival Information     Expected   -    Arrival   2/4/2023 11:09    Acuity   Emergent            Means of arrival   Walk-In    Escorted by   Spouse    Service   Hospitalist    Admission type   Emergency            Arrival complaint   slurred speech, blurry vision           Chief Complaint   Patient presents with   • Dizziness     Patient reports "cardiac arrest" last night, signed out from ER AMA  Wife states patient woke up with slurred speech, feeling "foggy" and "wobbly"  Initial Presentation: 56 y o  male presents to ED from home with dizziness, slurred speech, feels off balance, started the morning of admission  [place] [facility] he was  " drunk" upon waking  The day of admission  Wife noticed some slurred speech the evening prior to admission, worse the  Morning of admission, said he sounded like he was drunk  Has also noticed  Blurred vision, no previous issues  Denies  Shortness of breath but  Requires  O2  2L  NC  Had nausea and 1  Episode of vomiting the morning of admission  Has not seen a doctor in more than 20 years  Smokes   3/4  PPD, uses  THC and recent  Amphetamines  The day prior to admission,  Had a  LOC  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[employer]    Co worker performed  CPR and EMS  Arrived  Taken to hospital, signed  out AMA  CT brain unremarkable  CT head/neck: 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 bulb injuring 8 mm in maximum diameter   This is of unclear etiology and may be related to a prior history of trauma  Admit  Ip with  Stroke  Like symptoms and plan is  Neuro consult, PT/OT, MRI, 2 DE, monitor for signs of withdrawal and neuro consult  Date:    2/5        Day 2:   Continue  PT/OT     +  THC/amphetamines    Continue neuro checks  NIHSS 1  Monitor on tele  Wait  MRI  Remains on  O2  2L  NC  Complains of headache  No  Longer with blurred vision  Continue current meds/treatment plan       ED Triage Vitals   Temperature Pulse Respirations Blood Pressure SpO2   02/04/23 1118 02/04/23 1118 02/04/23 1118 02/04/23 1118 02/04/23 1118   97 7 °F (36 5 °C) 78 20 140/77 95 %      Temp Source Heart Rate Source Patient Position - Orthostatic VS BP Location FiO2 (%)   02/04/23 1118 02/04/23 1118 02/04/23 1118 02/04/23 1118 --   Temporal Monitor Lying Left arm       Pain Score       02/05/23 0821       4          Wt Readings from Last 1 Encounters:   02/04/23 56 5 kg (124 lb 9 oz)     Additional Vital Signs:   02/05/23 0145 -- 56 13 118/68 89 94 % -- -- -- --   02/05/23 0000 -- 62 12 131/72 -- 99 % -- -- -- --   02/04/23 2200 -- 59 13 123/68 -- 98 % -- -- -- --   02/04/23 2115 98 1 °F (36 7 °C) 56 14 119/70 89 96 % -- -- -- --   02/04/23 2000 -- 60 13 115/69 -- 97 % -- -- -- --   02/04/23 1930 -- 64 12 116/58 79 95 % -- -- -- --   02/04/23 1900 -- 64 18 -- -- 96 % -- -- -- --   02/04/23 1845 -- 62 9 Abnormal  118/73 90 95 % -- -- -- --   02/04/23 1830 -- 62 9 Abnormal  119/73 91 95 % -- -- -- --   02/04/23 1815 -- 58 8 Abnormal  119/72 92 95 % -- -- -- --   02/04/23 1800 -- 62 15 118/73 -- 95 % -- -- Nasal cannula --   02/04/23 1745 -- 62 10 Abnormal  114/63 83 95 % -- -- -- -- 02/04/23 1730 -- 69 22 129/76 97 96 % -- -- -- --   02/04/23 1715 -- 70 23 Abnormal  122/83 96 96 % -- -- -- --   02/04/23 1700 97 7 °F (36 5 °C) 64 15 115/71 90 94 % -- -- Nasal cannula --   02/04/23 1645 -- 69 15 112/75 89 95 % -- -- -- --   02/04/23 1630 -- 62 16 118/74 91 92 % -- -- -- --   02/04/23 1615 -- 71 27 Abnormal  120/71 90 96 % -- -- -- --   02/04/23 1600 -- 64 24 Abnormal  131/71 94 96 % -- -- Nasal cannula --   02/04/23 1545 -- 64 15 123/70 89 97 % -- -- -- --   02/04/23 1530 -- 65 15 111/71 87 96 % -- -- -- --   02/04/23 1515 -- 62 13 128/77 95 96 % -- -- -- --   02/04/23 1500 -- 66 21 122/75 94 96 % -- -- Nasal cannula --   02/04/23 1445 -- 66 20 137/87 106 96 % -- -- -- --   02/04/23 1430 -- 69 22 141/78 100 96 % -- -- -- --   02/04/23 1415 -- 57 10 Abnormal  125/77 96 96 % -- -- -- --   02/04/23 1400 97 7 °F (36 5 °C) 60 10 Abnormal  119/73 -- 96 % 28 2 L/min Nasal cannula --   02/04/23 1355 -- 60 10 Abnormal  -- -- 96 % -- -- -- --   02/04/23 1350 -- 62 16 -- -- 95 % -- -- -- --   02/04/23 1345 97 7 °F (36 5 °C) 65 16 119/76 -- 96 % 28 2 L/min Nasal cannula --   02/04/23 1340 -- 71 22 -- -- 96 % -- -- -- --   02/04/23 1335 -- 69 26 Abnormal  -- -- 97 % -- -- -- --   02/04/23 1330 97 7 °F (36 5 °C) 67 16 142/79 -- 97 % 28 2 L/min Nasal cannula --   02/04/23 1325 -- 61 11 Abnormal  -- -- 96 % -- -- -- --   02/04/23 1320 -- 71 21 -- -- 96 % -- -- -- --   02/04/23 1315 97 7 °F (36 5 °C) 63 12 118/70 -- 96 % 28 2 L/min Nasal cannula        Pertinent Labs/Diagnostic Test Results:   XR chest 1 view portable   Final Result by Prince Renea MD (02/04 1426)      No acute findings  Workstation performed: [id number]         CTA stroke alert (head/neck)   Final Result by Barry Levy DO (02/04 3287)      No stenosis, occlusion or thrombosis of the cervical or intracranial vasculature        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 bulb injuring 8 mm in maximum diameter  This is of unclear etiology and may be related to a prior history of trauma  Findings were directly discussed with Latha Dixon  at 11:48 AM                            Workstation performed: [id number]         CT stroke alert brain   Final Result by Gene Denney Seip, DO (02/04 1149)      No acute intracranial abnormality        Findings were directly discussed with Latha Dixon  at 11:48 AM       Workstation performed: [id number]         MRI Inpatient Order    (Results Pending)     Results from last 7 days   Lab Units 02/05/23  0444 02/04/23  1142   SARS-COV-2  Negative Negative     Results from last 7 days   Lab Units 02/05/23  0444 02/04/23  1130   WBC Thousand/uL 9 41 12 23*   HEMOGLOBIN g/dL 14 0 14 1   HEMATOCRIT % 43 7 44 5   PLATELETS Thousands/uL 240 256         Results from last 7 days   Lab Units 02/05/23  0444 02/04/23  1130   SODIUM mmol/L 134* 136   POTASSIUM mmol/L 4 6 4 8   CHLORIDE mmol/L 97 101   CO2 mmol/L 36* 31   ANION GAP mmol/L 1* 4   BUN mg/dL 17 19   CREATININE mg/dL 0 89 0 97   EGFR ml/min/1 73sq m 96 87   CALCIUM mg/dL 8 7 9 2   MAGNESIUM mg/dL 2 2  --    PHOSPHORUS mg/dL 2 8  --          Results from last 7 days   Lab Units 02/04/23  1123   POC GLUCOSE mg/dl 111     Results from last 7 days   Lab Units 02/05/23  0444 02/04/23  1130   GLUCOSE RANDOM mg/dL 105 111               Results from last 7 days   Lab Units 02/04/23  1603 02/04/23  1344 02/04/23  1130   HS TNI 0HR ng/L  --   --  5   HS TNI 2HR ng/L  --  5  --    HSTNI D2 ng/L  --  0  --    HS TNI 4HR ng/L 4  --   --    HSTNI D4 ng/L -1  --   --          Results from last 7 days   Lab Units 02/04/23  1130   PROTIME seconds 14 0   INR  1 07   PTT seconds 30     Results from last 7 days   Lab Units 02/04/23  1142   TSH 3RD GENERATON uIU/mL 2 136         Results from last 7 days   Lab Units 02/05/23  0444 02/04/23  1142 INFLUENZA A PCR  Negative Negative   INFLUENZA B PCR  Negative Negative   RSV PCR  Negative Negative         Results from last 7 days   Lab Units 02/04/23  1237   AMPH/METH  Positive*   BARBITURATE UR  Negative   BENZODIAZEPINE UR  Negative   COCAINE UR  Negative   METHADONE URINE  Negative   OPIATE UR  Negative   PCP UR  Negative   THC UR  Positive*     Results from last 7 days   Lab Units 02/04/23  1142   ETHANOL LVL mg/dL <10         ED Treatment:   Medication Administration from 02/04/2023 1107 to 02/05/2023 1440       Date/Time Order Dose Route Action Comments     02/04/2023 1141 EST iohexol (OMNIPAQUE) 350 MG/ML injection (SINGLE-DOSE) 100 mL 100 mL Intravenous Given --     02/04/2023 1209 EST aspirin chewable tablet 324 mg 324 mg Oral Given --     02/04/2023 1208 EST clopidogrel (PLAVIX) tablet 300 mg 300 mg Oral Given --     02/05/2023 0821 EST acetaminophen (TYLENOL) tablet 650 mg 650 mg Oral Given --     02/04/2023 1728 EST ondansetron (ZOFRAN) injection 4 mg 4 mg Intravenous Given --     02/05/2023 1310 EST nicotine (NICODERM CQ) 14 mg/24hr TD 24 hr patch 1 patch 1 patch Transdermal Medication Applied --     02/04/2023 1503 EST nicotine (NICODERM CQ) 14 mg/24hr TD 24 hr patch 1 patch 1 patch Transdermal Medication Applied --     02/05/2023 1310 EST enoxaparin (LOVENOX) subcutaneous injection 40 mg 40 mg Subcutaneous Given --     02/04/2023 1501 EST enoxaparin (LOVENOX) subcutaneous injection 40 mg 40 mg Subcutaneous Given --     02/04/2023 1712 EST atorvastatin (LIPITOR) tablet 40 mg 40 mg Oral Given --     02/05/2023 0821 EST clopidogrel (PLAVIX) tablet 75 mg 75 mg Oral Given --     02/05/2023 0821 EST aspirin (ECOTRIN LOW STRENGTH) EC tablet 81 mg 81 mg Oral Given --     02/04/2023 2117 EST melatonin tablet 3 mg 3 mg Oral Given --     02/05/2023 1312 EST metoclopramide (REGLAN) injection 10 mg 10 mg Intravenous Not Given --     02/05/2023 1311 EST ketorolac (TORADOL) injection 30 mg 30 mg Intravenous Not Given --          Admitting Diagnosis: Dizziness [R42]  Slurred speech [R47 81]  Stroke-like symptoms [R29 90]  Age/Sex: 56 y o  male  Admission Orders:  Scheduled Medications:  aspirin, 81 mg, Oral, Daily  atorvastatin, 40 mg, Oral, QPM  clopidogrel, 75 mg, Oral, Daily  enoxaparin, 40 mg, Subcutaneous, Daily  melatonin, 3 mg, Oral, HS  nicotine, 1 patch, Transdermal, Daily      Continuous IV Infusions:     PRN Meds:  acetaminophen, 650 mg, Oral, Q6H PRN  calcium carbonate, 1,000 mg, Oral, Daily PRN  diphenhydrAMINE, 25 mg, Oral, Q6H PRN  ondansetron, 4 mg, Intravenous, Q6H PRN  polyethylene glycol, 17 g, Oral, Daily PRN    Neuro checks  Q 15 min  X 4,  Q 30 min  X 2,  Q 1 hr  X 4  Then Q 4 hrs  Stroke teaching  Dysphagia eval    IP CONSULT TO NEUROLOGY  IP CONSULT TO CASE MANAGEMENT  IP CONSULT TO NUTRITION SERVICES    Network Utilization Review Department  ATTENTION: Please call with any questions or concerns to [phone] and carefully listen to the prompts so that you are directed to the right person  All voicemails are confidential   [facility] all requests for admission clinical reviews, approved or denied determinations and any other requests to dedicated fax number below belonging to the campus where the patient is receiving treatment   List of dedicated fax numbers for the Facilities:  [address] DENIALS (Administrative/Medical Necessity) [phone]   [address] (Maternity/NICU/Pediatrics) [phone]   [address] [phone]   [facility]  [phone]   [facility] [address] [address] [place] [id number] [place] [address] [id number] [facility] [place] [address] [facility] [phone]

## 2023-02-05 NOTE — PLAN OF CARE
Problem: PAIN - ADULT  Goal: Verbalizes/displays adequate comfort level or baseline comfort level  Description: Interventions:  - Encourage patient to monitor pain and request assistance  - Assess pain using appropriate pain scale  - Administer analgesics based on type and severity of pain and evaluate response  - Implement non-pharmacological measures as appropriate and evaluate response  - Consider cultural and social influences on pain and pain management  - Notify physician/advanced practitioner if interventions unsuccessful or patient reports new pain  Outcome: Progressing     Problem: INFECTION - ADULT  Goal: Absence or prevention of progression during hospitalization  Description: INTERVENTIONS:  - Assess and monitor for signs and symptoms of infection  - Monitor lab/diagnostic results  - Monitor all insertion sites, i e  indwelling lines, tubes, and drains  - Monitor endotracheal if appropriate and nasal secretions for changes in amount and color  - [place] appropriate cooling/warming therapies per order  - Administer medications as ordered  - Instruct and encourage patient and family to use good hand hygiene technique  - Identify and instruct in appropriate isolation precautions for identified infection/condition  Outcome: Progressing     Problem: SAFETY ADULT  Goal: Maintain or return to baseline ADL function  Description: INTERVENTIONS:  -  Assess patient's ability to carry out ADLs; assess patient's baseline for ADL function and identify physical deficits which impact ability to perform ADLs (bathing, care of mouth/teeth, toileting, grooming, dressing, etc )  - Assess/evaluate cause of self-care deficits   - Assess range of motion  - Assess patient's mobility; develop plan if impaired  - Assess patient's need for assistive devices and provide as appropriate  - Encourage maximum independence but intervene and supervise when necessary  - Involve family in performance of ADLs  - Assess for home care needs following discharge   - Consider OT consult to assist with ADL evaluation and planning for discharge  - Provide patient education as appropriate  Outcome: Progressing  Goal: Maintains/Returns to pre admission functional level  Description: INTERVENTIONS:  - Perform BMAT or MOVE assessment daily    - Set and communicate daily mobility goal to care team and patient/family/caregiver  - Collaborate with rehabilitation services on mobility goals if consulted  - Perform Range of Motion 2 times a day  - Reposition patient every 2 hours  - Dangle patient 2 times a day  - Stand patient 2 times a day  - Ambulate patient 2 times a day  - Out of bed to chair 2 times a day   - Out of bed for meals 2 times a day  - Out of bed for toileting  - Record patient progress and toleration of activity level   Outcome: Progressing     Problem: DISCHARGE PLANNING  Goal: Discharge to home or other facility with appropriate resources  Description: INTERVENTIONS:  - Identify barriers to discharge w/patient and caregiver  - Arrange for needed discharge resources and transportation as appropriate  - Identify discharge learning needs (meds, wound care, etc )  - Arrange for interpretive services to assist at discharge as needed  - Refer to Case Management Department for coordinating discharge planning if the patient needs post-hospital services based on physician/advanced practitioner order or complex needs related to functional status, cognitive ability, or social support system  Outcome: Progressing     Problem: Knowledge Deficit  Goal: Patient/family/caregiver demonstrates understanding of disease process, treatment plan, medications, and discharge instructions  Description: Complete learning assessment and assess knowledge base    Interventions:  - Provide teaching at level of understanding  - Provide teaching via preferred learning methods  Outcome: Progressing

## 2023-02-06 ENCOUNTER — APPOINTMENT (INPATIENT)
Dept: NON INVASIVE DIAGNOSTICS | Facility: HOSPITAL | Age: 58
End: 2023-02-06

## 2023-02-06 ENCOUNTER — APPOINTMENT (INPATIENT)
Dept: MRI IMAGING | Facility: HOSPITAL | Age: 58
End: 2023-02-06

## 2023-02-06 VITALS
HEART RATE: 68 BPM | SYSTOLIC BLOOD PRESSURE: 143 MMHG | WEIGHT: 124 LBS | OXYGEN SATURATION: 96 % | TEMPERATURE: 98.4 F | DIASTOLIC BLOOD PRESSURE: 85 MMHG | RESPIRATION RATE: 18 BRPM | BODY MASS INDEX: 17.75 KG/M2 | HEIGHT: 70 IN

## 2023-02-06 PROBLEM — I46.9 CARDIAC ARREST (HCC): Status: RESOLVED | Noted: 2023-02-05 | Resolved: 2023-02-06

## 2023-02-06 PROBLEM — R29.90 STROKE-LIKE SYMPTOMS: Status: RESOLVED | Noted: 2023-02-04 | Resolved: 2023-02-06

## 2023-02-06 PROBLEM — J96.01 ACUTE RESPIRATORY FAILURE WITH HYPOXIA (HCC): Status: RESOLVED | Noted: 2023-02-04 | Resolved: 2023-02-06

## 2023-02-06 LAB
ANION GAP SERPL CALCULATED.3IONS-SCNC: 2 MMOL/L (ref 4–13)
AORTIC ROOT: 3.1 CM
APICAL FOUR CHAMBER EJECTION FRACTION: 81 %
BUN SERPL-MCNC: 13 MG/DL (ref 5–25)
CALCIUM SERPL-MCNC: 9 MG/DL (ref 8.4–10.2)
CHLORIDE SERPL-SCNC: 101 MMOL/L (ref 96–108)
CO2 SERPL-SCNC: 32 MMOL/L (ref 21–32)
CREAT SERPL-MCNC: 0.84 MG/DL (ref 0.6–1.3)
E WAVE DECELERATION TIME: 242 MS
FRACTIONAL SHORTENING: 47 % (ref 28–44)
GFR SERPL CREATININE-BSD FRML MDRD: 98 ML/MIN/1.73SQ M
GLUCOSE SERPL-MCNC: 94 MG/DL (ref 65–140)
INTERVENTRICULAR SEPTUM IN DIASTOLE (PARASTERNAL SHORT AXIS VIEW): 0.8 CM
INTERVENTRICULAR SEPTUM: 0.8 CM (ref 0.6–1.1)
LAAS-AP2: 9.7 CM2
LAAS-AP4: 8 CM2
LEFT ATRIUM SIZE: 2.3 CM
LEFT INTERNAL DIMENSION IN SYSTOLE: 2.4 CM (ref 2.1–4)
LEFT VENTRICLE DIASTOLIC VOLUME (MOD BIPLANE): 35 ML
LEFT VENTRICLE SYSTOLIC VOLUME (MOD BIPLANE): 10 ML
LEFT VENTRICULAR INTERNAL DIMENSION IN DIASTOLE: 4.5 CM (ref 3.5–6)
LEFT VENTRICULAR POSTERIOR WALL IN END DIASTOLE: 0.8 CM
LEFT VENTRICULAR STROKE VOLUME: 71 ML
LV EF: 72 %
LVSV (TEICH): 71 ML
MAGNESIUM SERPL-MCNC: 2.1 MG/DL (ref 1.9–2.7)
MV E'TISSUE VEL-LAT: 9 CM/S
MV E'TISSUE VEL-SEP: 10 CM/S
MV PEAK A VEL: 0.69 M/S
MV PEAK E VEL: 53 CM/S
MV STENOSIS PRESSURE HALF TIME: 70 MS
MV VALVE AREA P 1/2 METHOD: 3.14 CM2
POTASSIUM SERPL-SCNC: 4.1 MMOL/L (ref 3.5–5.3)
RIGHT ATRIAL 2D VOLUME: 10 ML
RIGHT ATRIUM AREA SYSTOLE A4C: 6.1 CM2
RIGHT VENTRICLE ID DIMENSION: 3.1 CM
SL CV LEFT ATRIUM LENGTH A2C: 4.4 CM
SL CV PED ECHO LEFT VENTRICLE DIASTOLIC VOLUME (MOD BIPLANE) 2D: 92 ML
SL CV PED ECHO LEFT VENTRICLE SYSTOLIC VOLUME (MOD BIPLANE) 2D: 21 ML
SODIUM SERPL-SCNC: 135 MMOL/L (ref 135–147)
TR MAX PG: 26 MMHG
TR PEAK VELOCITY: 2.6 M/S
TRICUSPID VALVE PEAK REGURGITATION VELOCITY: 2.55 M/S

## 2023-02-06 RX ORDER — ATORVASTATIN CALCIUM 40 MG/1
40 TABLET, FILM COATED ORAL EVERY EVENING
Qty: 30 TABLET | Refills: 0 | Status: SHIPPED | OUTPATIENT
Start: 2023-02-06 | End: 2023-02-13 | Stop reason: SDUPTHER

## 2023-02-06 RX ORDER — CLOPIDOGREL BISULFATE 75 MG/1
75 TABLET ORAL DAILY
Qty: 19 TABLET | Refills: 0 | Status: SHIPPED | OUTPATIENT
Start: 2023-02-07

## 2023-02-06 RX ORDER — ASPIRIN 81 MG/1
81 TABLET ORAL DAILY
Qty: 30 TABLET | Refills: 0 | Status: SHIPPED | OUTPATIENT
Start: 2023-02-07

## 2023-02-06 RX ADMIN — NICOTINE 1 PATCH: 14 PATCH, EXTENDED RELEASE TRANSDERMAL at 14:30

## 2023-02-06 RX ADMIN — ASPIRIN 81 MG: 81 TABLET ORAL at 08:05

## 2023-02-06 RX ADMIN — ENOXAPARIN SODIUM 40 MG: 40 INJECTION SUBCUTANEOUS at 14:31

## 2023-02-06 RX ADMIN — CLOPIDOGREL 75 MG: 75 TABLET, FILM COATED ORAL at 08:06

## 2023-02-06 NOTE — ASSESSMENT & PLAN NOTE
· Currently requiring 2 L nasal cannula  No oxygen at home  · Patient does have a history of smoking three quarters of a pack a day    · No history of COPD/emphysema  · Suspect there is underlying lung disease due to history of smoking and distant breath sounds  · Respiratory protocol  · Incentive spirometer  · Wean off oxygen as able keeping O2 sats above 89%  Resolved

## 2023-02-06 NOTE — DISCHARGE INSTR - AVS FIRST PAGE
Follow-up with vascular and neurology outpatient for pseudoaneurysm  Referrals will be placed for you  Call for appointments  Continue taking atorvastatin 40 mg daily  Continue taking aspirin 81 mg daily and plavix 75 mg daily for 21 days, then continue with aspirin only    Establish care with a PCP within the week for follow up

## 2023-02-06 NOTE — PLAN OF CARE
Problem: PAIN - ADULT  Goal: Verbalizes/displays adequate comfort level or baseline comfort level  Description: Interventions:  - Encourage patient to monitor pain and request assistance  - Assess pain using appropriate pain scale  - Administer analgesics based on type and severity of pain and evaluate response  - Implement non-pharmacological measures as appropriate and evaluate response  - Consider cultural and social influences on pain and pain management  - Notify physician/advanced practitioner if interventions unsuccessful or patient reports new pain  Outcome: Completed     Problem: INFECTION - ADULT  Goal: Absence or prevention of progression during hospitalization  Description: INTERVENTIONS:  - Assess and monitor for signs and symptoms of infection  - Monitor lab/diagnostic results  - Monitor all insertion sites, i e  indwelling lines, tubes, and drains  - Monitor endotracheal if appropriate and nasal secretions for changes in amount and color  - [place] appropriate cooling/warming therapies per order  - Administer medications as ordered  - Instruct and encourage patient and family to use good hand hygiene technique  - Identify and instruct in appropriate isolation precautions for identified infection/condition  Outcome: Completed     Problem: SAFETY ADULT  Goal: Maintain or return to baseline ADL function  Description: INTERVENTIONS:  -  Assess patient's ability to carry out ADLs; assess patient's baseline for ADL function and identify physical deficits which impact ability to perform ADLs (bathing, care of mouth/teeth, toileting, grooming, dressing, etc )  - Assess/evaluate cause of self-care deficits   - Assess range of motion  - Assess patient's mobility; develop plan if impaired  - Assess patient's need for assistive devices and provide as appropriate  - Encourage maximum independence but intervene and supervise when necessary  - Involve family in performance of ADLs  - Assess for home care needs following discharge   - Consider OT consult to assist with ADL evaluation and planning for discharge  - Provide patient education as appropriate  Outcome: Completed  Goal: Maintains/Returns to pre admission functional level  Description: INTERVENTIONS:  - Perform BMAT or MOVE assessment daily    - Set and communicate daily mobility goal to care team and patient/family/caregiver  - Collaborate with rehabilitation services on mobility goals if consulted  - Perform Range of Motion 2 times a day  - Reposition patient every 2 hours  - Dangle patient 2 times a day  - Stand patient 2 times a day  - Ambulate patient 2 times a day  - Out of bed to chair 2 times a day   - Out of bed for meals 2 times a day  - Out of bed for toileting  - Record patient progress and toleration of activity level   Outcome: Completed     Problem: DISCHARGE PLANNING  Goal: Discharge to home or other facility with appropriate resources  Description: INTERVENTIONS:  - Identify barriers to discharge w/patient and caregiver  - Arrange for needed discharge resources and transportation as appropriate  - Identify discharge learning needs (meds, wound care, etc )  - Arrange for interpretive services to assist at discharge as needed  - Refer to Case Management Department for coordinating discharge planning if the patient needs post-hospital services based on physician/advanced practitioner order or complex needs related to functional status, cognitive ability, or social support system  Outcome: Completed     Problem: Knowledge Deficit  Goal: Patient/family/caregiver demonstrates understanding of disease process, treatment plan, medications, and discharge instructions  Description: Complete learning assessment and assess knowledge base    Interventions:  - Provide teaching at level of understanding  - Provide teaching via preferred learning methods  Outcome: Completed

## 2023-02-06 NOTE — DISCHARGE SUMMARY
[address]  Discharge- Cordelia Adams 1965, 56 y o  male MRN: [id number]  Unit/Bed#: -01 Encounter: [id number]  Primary Care Provider: No primary care provider on file  Date and time admitted to hospital: 2/4/2023 11:13 AM    * Stroke-like symptoms  Assessment & Plan  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 bulb injuring 8 mm in maximum diameter  This is of unclear etiology and may be related to a prior history of trauma  · CT brain: No acute intracranial abnormalities  · Ethanol normal  · Positive for THC and amphetamine/methamphetamine  Patient admits to smoking THC and recently using amphetamines  States that he only took them once  · Neurology consulted: MRI brain, permissive HTN upto 180 mm Hg given large pseudoaneurysm, DUAP load- ASA full dose and Plavix 300 x 1 followed by ASA 81 mg and plavix 75 mg x 21 days at least then should be able to switch to ASA only  Atorvastatin 40 mg daily  · MRI: White matter changes suggestive of very mild chronic microangiopathy  No acute intracranial pathology  · Echo ordered  · TSH normal, LDL mildly elevated  · A1c 5 8  · Monitor on telemetry: sinus bradycardia  · PT/OT: no rehabilitation needs  · Speech   · Follow-up with vascular and neurology outpatient for pseudoaneurysm      Cardiac arrest [facility])  Assessment & Plan  · was noted to pass out at work day PTA and co-worker had to do CPR on him 2 times for his "heart stopping"  · EKG with no signs of ischemia  · Trop wnl  · no h/o sudden cardiac death or early MI in family   · Echo ordered  · patient uses methamphetamine (states that this time it was just a "slip-up") but unclear of how much he really uses; UDS was positive  Heavy meth use can contribute to sudden cardiac arrest and is a possibility given positive UDS  · Monitor on telemetry    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[facility])  Assessment & Plan  · CTA showing a 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 above the ICA bulb injuring 8 mm in maximum diameter  · Neurology recommending follow-up with neurovascular surgery outpatient  Drug usage  Assessment & Plan  · Patient test positive for THC and amphetamine/methamphetamine on urine drug test   Patient does admit to taking once recently, states that this was a "slip up "  Denies further drug use and using amphetamines more frequently  · Does admit to smoking THC  · Counseled on illicit drug cessation  Patient understands  · No withdrawal symptoms at this time  · Monitor for withdrawal symptoms    Nicotine abuse  Assessment & Plan  · Currently smokes quarter of a pack a day  · Patient would like nicotine patch while inpatient  · Counseled on smoking cessation, patient declines at this time  Acute respiratory failure with hypoxia (HCC)  Assessment & Plan  · Currently requiring 2 L nasal cannula  No oxygen at home  · Patient does have a history of smoking three quarters of a pack a day    · No history of COPD/emphysema  · Suspect there is underlying lung disease due to history of smoking and distant breath sounds  · Respiratory protocol  · Incentive spirometer  · Wean off oxygen as able keeping O2 sats above 89%  Resolved      Medical Problems     Resolved Problems  Date Reviewed: 2/6/2023   None Discharging Physician / Practitioner: Suad Stanley PA-C  PCP: No primary care provider on file  Admission Date:   Admission Orders (From admission, onward)     Ordered        02/04/23 1248  INPATIENT ADMISSION  Once                      Discharge Date: 02/06/23    Consultations During Hospital Stay:  · Neurology, PT/OT    Procedures Performed:   · None    Significant Findings / Test Results:   · MRI: White matter changes suggestive of very mild chronic microangiopathy  No acute intracranial pathology  · XR chest: no acute findings  · CTA stroke alert head/neck: No stenosis, occlusion or thrombosis of the cervical or intracranial vasculature  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 bulb injuring 8 mm in maximum diameter  This is of unclear etiology and may be related to a prior history of trauma  · CT brain: No acute intracranial abnormality  · Echo: Left Ventricle: Normal left ventricular wall thickness, cavity size, and systolic function  Estimated ejection fraction 72%  No regional wall motion normality identified  Normal end-diastolic pressures estimated with mild diastolic relaxation abnormality  Right Ventricle: Normal right ventricular size and function  Atrial Septum: No patent foramen ovale detected  Aortic Valve: Trileaflet, mildly thickened aortic valve with normal leaflet excursion and flow patterns  Mitral Valve: Mild thickening of the mitral leaflets with preserved excursion and trace insufficiency  Tricuspid Valve: Normal-appearing tricuspid leaflets with trace insufficiency  Measured tricuspid regurgitant velocity of 2 5 m/s corresponds to gradient of 26 mmHg  With addition of 10 mmHg for estimated right atrial pressure, estimated pulmonary pressure is top normal to mildly creased to 36 mmHg    · LDL: 118  · A1c: 5 8  · Urine drug screen: positive amph/meth, positive THC    Incidental Findings: · See above   · I reviewed the above mentioned incidental findings with the patient and/or family and they expressed understanding  Test Results Pending at Discharge (will require follow up): · None     Outpatient Tests Requested:  · Follow up with vascular and neurology outpatient for pseudoaneurysm  · Establish care with PCP    Complications:  None    Reason for Admission: stroke work-up    Hospital Course:   Krupa Aguilera is a 56 y o  male patient with PMH of nicotine abuse and drug usage who originally presented to the hospital on 2/4/2023 due to dizziness, slurred speech, feeling off balance that started in the morning  Patient also stated that he had cardiac arrest the day before and left the hospital AMA  When patient arrived to the ED, stroke alert was called and neurology was consulted  Neurology recommended patient be admitted for completion of stroke work-up  CTA and CT head with results as above  Patient was not given tPA due to being outside of window  Patient was given loading dose of aspirin and Plavix followed by aspirin 81 mg and Plavix 75 mg  Started on atorvastatin 40 mg daily  MRI was ordered and results are shown as above  A1c and lipid panel done with results as above  An echo was ordered with results as above  Patient improved during his stay, no slurred speech, dizziness, off-balance  Stroke work-up was negative for stroke  Patient also noted to be hypoxic during ED and placed on 2 L nasal cannula  Due to history of smoking, likely the patient has underlying COPD or emphysema or chronic bronchitis due to "smokers cough"  He has not been to the doctor in over 20 years and has not been diagnosed  Patient was able to be weaned to room air during his stay and was without further complications  Recommended he follows up and establishes care with PCP  Patient was noted to have a positive urine drug screening with THC and amphetamines present    Discussed this with him that this can cause him to have cardiac arrest with heavy drug usage  Did discuss with patient that it is important to stop abusing illicit substances  Also with history of nicotine abuse  Smokes three quarters of a pack a day  Explained that smoking cessation is important to help decrease the risk of stroke and future cardiac events  Of note, patient was noted to have a pseudoaneurysm on scans, discussed this finding with patient  Recommended that he follows up with neurovascular surgery outpatient  Did refer him to vascular surgery and neurology  Patient is to continue taking aspirin 81 mg and Plavix 75 mg by mouth for 21 days and then continue on just aspirin alone daily  Continue with atorvastatin 40 mg daily  Follow-up with neurology and vascular surgery outpatient for pseudoaneurysm  Referral was placed for these  Referral was placed for primary care physician as patient does not have one  Recommended that he follows up in 1 to 2 weeks  Please see above list of diagnoses and related plan for additional information  Condition at Discharge: stable    Discharge Day Visit / Exam:   Subjective: Patient seen and examined at bedside this morning  He admits to feeling well no complaints  He states that his headache is gone  Denies dizziness, blurred vision, nausea, vomiting, off-balance feeling, shortness of breath, chest pain  Vitals: Blood Pressure: 144/84 (02/06/23 0500)  Pulse: 68 (02/06/23 0500)  Temperature: 98 8 °F (37 1 °C) (02/06/23 0500)  Temp Source: Temporal (02/06/23 0500)  Respirations: 18 (02/06/23 0500)  Height: 5' 10" (177 8 cm) (02/04/23 1118)  Weight - Scale: 56 5 kg (124 lb 9 oz) (02/04/23 1118)  SpO2: 96 % (02/06/23 0500)  Exam:   Physical Exam  Vitals reviewed  Constitutional:       General: He is not in acute distress  Appearance: He is not ill-appearing or toxic-appearing  HENT:      Head: Normocephalic and atraumatic        Mouth/Throat:      Mouth: Mucous membranes are moist    Eyes:      Pupils: Pupils are equal, round, and reactive to light  Cardiovascular:      Rate and Rhythm: Normal rate and regular rhythm  Heart sounds: Normal heart sounds  Pulmonary:      Effort: Pulmonary effort is normal  No respiratory distress  Breath sounds: Normal breath sounds  No stridor  No wheezing  Abdominal:      General: Bowel sounds are normal  There is no distension  Palpations: Abdomen is soft  There is no mass  Tenderness: There is no abdominal tenderness  Musculoskeletal:         General: Normal range of motion  Right lower leg: No edema  Left lower leg: No edema  Skin:     General: Skin is warm and dry  Neurological:      General: No focal deficit present  Mental Status: He is alert and oriented to person, place, and time  Psychiatric:         Mood and Affect: Mood normal          Behavior: Behavior normal           Discussion with Family: Patient declined call to   He is calling is significant other and updating her  Discharge instructions/Information to patient and family:   See after visit summary for information provided to patient and family  Provisions for Follow-Up Care:  See after visit summary for information related to follow-up care and any pertinent home health orders  Disposition:   Home    Planned Readmission: No     Discharge Statement:  I spent 60 minutes discharging the patient  This time was spent on the day of discharge  I had direct contact with the patient on the day of discharge  Greater than 50% of the total time was spent examining patient, answering all patient questions, arranging and discussing plan of care with patient as well as directly providing post-discharge instructions  Additional time then spent on discharge activities  Discharge Medications:  See after visit summary for reconciled discharge medications provided to patient and/or family        **Please Note: This note may have been constructed using a voice recognition system**

## 2023-02-06 NOTE — PLAN OF CARE
Problem: PAIN - ADULT  Goal: Verbalizes/displays adequate comfort level or baseline comfort level  Description: Interventions:  - Encourage patient to monitor pain and request assistance  - Assess pain using appropriate pain scale  - Administer analgesics based on type and severity of pain and evaluate response  - Implement non-pharmacological measures as appropriate and evaluate response  - Consider cultural and social influences on pain and pain management  - Notify physician/advanced practitioner if interventions unsuccessful or patient reports new pain  Outcome: Progressing     Problem: SAFETY ADULT  Goal: Maintain or return to baseline ADL function  Description: INTERVENTIONS:  -  Assess patient's ability to carry out ADLs; assess patient's baseline for ADL function and identify physical deficits which impact ability to perform ADLs (bathing, care of mouth/teeth, toileting, grooming, dressing, etc )  - Assess/evaluate cause of self-care deficits   - Assess range of motion  - Assess patient's mobility; develop plan if impaired  - Assess patient's need for assistive devices and provide as appropriate  - Encourage maximum independence but intervene and supervise when necessary  - Involve family in performance of ADLs  - Assess for home care needs following discharge   - Consider OT consult to assist with ADL evaluation and planning for discharge  - Provide patient education as appropriate  Outcome: Progressing  Goal: Maintains/Returns to pre admission functional level  Description: INTERVENTIONS:  - Perform BMAT or MOVE assessment daily    - Set and communicate daily mobility goal to care team and patient/family/caregiver  - Collaborate with rehabilitation services on mobility goals if consulted  - Perform Range of Motion 2 times a day  - Reposition patient every 2 hours    - Dangle patient 2 times a day  - Stand patient 2 times a day  - Ambulate patient 2 times a day  - Out of bed to chair 2 times a day   - Out of bed for meals 2 times a day  - Out of bed for toileting  - Record patient progress and toleration of activity level   Outcome: Progressing

## 2023-02-06 NOTE — NURSING NOTE
IV removed  AVS discussed with patient who verbalized understanding  All belongings accounted for  Patient ambulated independently to the front lobby for discharge

## 2023-02-06 NOTE — ASSESSMENT & PLAN NOTE
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 bulb injuring 8 mm in maximum diameter  This is of unclear etiology and may be related to a prior history of trauma  · CT brain: No acute intracranial abnormalities  · Ethanol normal  · Positive for THC and amphetamine/methamphetamine  Patient admits to smoking THC and recently using amphetamines  States that he only took them once  · Neurology consulted: MRI brain, permissive HTN upto 180 mm Hg given large pseudoaneurysm, DUAP load- ASA full dose and Plavix 300 x 1 followed by ASA 81 mg and plavix 75 mg x 21 days at least then should be able to switch to ASA only  Atorvastatin 40 mg daily  · MRI: White matter changes suggestive of very mild chronic microangiopathy  No acute intracranial pathology  · Echo: EF 72%, no PFO, No regional wall motion normality identified  Normal end-diastolic pressures estimated with mild diastolic relaxation abnormality  · TSH normal, LDL mildly elevated  · A1c 5 8  · Monitor on telemetry: sinus bradycardia  · PT/OT: no rehabilitation needs  · Speech no needs  · Follow-up with vascular and neurology outpatient for pseudoaneurysm

## 2023-02-06 NOTE — PLAN OF CARE
Problem: PAIN - ADULT  Goal: Verbalizes/displays adequate comfort level or baseline comfort level  Description: Interventions:  - Encourage patient to monitor pain and request assistance  - Assess pain using appropriate pain scale  - Administer analgesics based on type and severity of pain and evaluate response  - Implement non-pharmacological measures as appropriate and evaluate response  - Consider cultural and social influences on pain and pain management  - Notify physician/advanced practitioner if interventions unsuccessful or patient reports new pain  Outcome: Progressing     Problem: INFECTION - ADULT  Goal: Absence or prevention of progression during hospitalization  Description: INTERVENTIONS:  - Assess and monitor for signs and symptoms of infection  - Monitor lab/diagnostic results  - Monitor all insertion sites, i e  indwelling lines, tubes, and drains  - Monitor endotracheal if appropriate and nasal secretions for changes in amount and color  - [facility] appropriate cooling/warming therapies per order  - Administer medications as ordered  - Instruct and encourage patient and family to use good hand hygiene technique  - Identify and instruct in appropriate isolation precautions for identified infection/condition  Outcome: Progressing     Problem: SAFETY ADULT  Goal: Maintain or return to baseline ADL function  Description: INTERVENTIONS:  -  Assess patient's ability to carry out ADLs; assess patient's baseline for ADL function and identify physical deficits which impact ability to perform ADLs (bathing, care of mouth/teeth, toileting, grooming, dressing, etc )  - Assess/evaluate cause of self-care deficits   - Assess range of motion  - Assess patient's mobility; develop plan if impaired  - Assess patient's need for assistive devices and provide as appropriate  - Encourage maximum independence but intervene and supervise when necessary  - Involve family in performance of ADLs  - Assess for home care needs following discharge   - Consider OT consult to assist with ADL evaluation and planning for discharge  - Provide patient education as appropriate  Outcome: Progressing  Goal: Maintains/Returns to pre admission functional level  Description: INTERVENTIONS:  - Perform BMAT or MOVE assessment daily    - Set and communicate daily mobility goal to care team and patient/family/caregiver  - Collaborate with rehabilitation services on mobility goals if consulted  - Perform Range of Motion 2 times a day  - Reposition patient every 2 hours  - Dangle patient 2 times a day  - Stand patient 2 times a day  - Ambulate patient 2 times a day  - Out of bed to chair 2 times a day   - Out of bed for meals 2 times a day  - Out of bed for toileting  - Record patient progress and toleration of activity level   Outcome: Progressing     Problem: DISCHARGE PLANNING  Goal: Discharge to home or other facility with appropriate resources  Description: INTERVENTIONS:  - Identify barriers to discharge w/patient and caregiver  - Arrange for needed discharge resources and transportation as appropriate  - Identify discharge learning needs (meds, wound care, etc )  - Arrange for interpretive services to assist at discharge as needed  - Refer to Case Management Department for coordinating discharge planning if the patient needs post-hospital services based on physician/advanced practitioner order or complex needs related to functional status, cognitive ability, or social support system  Outcome: Progressing     Problem: Knowledge Deficit  Goal: Patient/family/caregiver demonstrates understanding of disease process, treatment plan, medications, and discharge instructions  Description: Complete learning assessment and assess knowledge base    Interventions:  - Provide teaching at level of understanding  - Provide teaching via preferred learning methods  Outcome: Progressing

## 2023-02-06 NOTE — SPEECH THERAPY NOTE
Speech Language/Pathology  Consult received  Records reviewed  Pt admitted c symptoms concerning for CVA/TIA  Pt passed RN Dysphagia Assessment  Communication deficits denied  [facility] MRI results "White matter changes suggestive of very mild chronic microangiopathy  No acute intracranial pathology "  No additional inpatient Speech Pathology evaluation appears indicated at this time  Please re-consult if additional concerns arise  Thank you      Abbie Villalba 89 CCC-SLP  2/6/2023

## 2023-02-13 ENCOUNTER — OFFICE VISIT (OUTPATIENT)
Dept: FAMILY MEDICINE CLINIC | Facility: CLINIC | Age: 58
End: 2023-02-13

## 2023-02-13 ENCOUNTER — TRANSITIONAL CARE MANAGEMENT (OUTPATIENT)
Dept: FAMILY MEDICINE CLINIC | Facility: CLINIC | Age: 58
End: 2023-02-13

## 2023-02-13 VITALS
DIASTOLIC BLOOD PRESSURE: 69 MMHG | HEIGHT: 67 IN | TEMPERATURE: 97.6 F | OXYGEN SATURATION: 99 % | HEART RATE: 76 BPM | WEIGHT: 128.2 LBS | SYSTOLIC BLOOD PRESSURE: 108 MMHG | BODY MASS INDEX: 20.12 KG/M2

## 2023-02-13 DIAGNOSIS — I72.9 PSEUDOANEURYSM (HCC): ICD-10-CM

## 2023-02-13 DIAGNOSIS — E78.2 MIXED HYPERLIPIDEMIA: ICD-10-CM

## 2023-02-13 DIAGNOSIS — R29.90 STROKE-LIKE SYMPTOMS: ICD-10-CM

## 2023-02-13 DIAGNOSIS — Z12.11 SCREENING FOR COLON CANCER: ICD-10-CM

## 2023-02-13 DIAGNOSIS — Z09 HOSPITAL DISCHARGE FOLLOW-UP: Primary | ICD-10-CM

## 2023-02-13 RX ORDER — ATORVASTATIN CALCIUM 20 MG/1
20 TABLET, FILM COATED ORAL EVERY EVENING
Qty: 30 TABLET | Refills: 2 | Status: SHIPPED | OUTPATIENT
Start: 2023-02-13

## 2023-02-13 NOTE — PROGRESS NOTES
TCM Call     Date and time call was made  2023  9:17 AM    Hospital care reviewed  Records reviewed    Patient was hospitialized at  [address]    Date of Admission  23    Date of discharge  23    Disposition  Home      TCM Call     I have advised the patient to call PCP with any new or worsening symptoms  Alejandra Magdaleno [facility] or [facility]ific[facility] other    Support System  Friends; Family; Partner    The type of support provided  Emotional; Financial; Physical    Do you have social support  Yes, as much as I need          Name: Bessie Robertson      : 1965      MRN: [id number]  Encounter Provider: MELISSA Murphy  Encounter Date: 2023   Encounter department: [address] [place] [address] PRIMARY CARE    Assessment & Plan     1  Hospital discharge follow-up    2  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[facility])  -     Ambulatory Referral to Neurosurgery; Future    3  Stroke-like symptoms  -     Ambulatory Referral to Neurology; Future  -     atorvastatin (LIPITOR) 20 mg tablet; Take 1 tablet (20 mg total) by mouth every evening    4  Mixed hyperlipidemia  -     Comprehensive metabolic panel; Future    5  Screening for colon cancer  -     Ambulatory Referral to Gastroenterology; Future    He will finish his course of Plavix and then continue with the ASA  Will lower statin dose for him  Referrals placed for neurology and neurosurgery  Reviewed scan results and pseudoaneurysm  Will re-evaluate in 3 months  Depression Screening and Follow-up Plan: Patient was screened for depression during today's encounter  They screened negative with a PHQ-2 score of 0  Tobacco Cessation Counseling: Tobacco cessation counseling was not provided  The patient is sincerely urged to quit consumption of tobacco  He is not ready to quit tobacco          Subjective      Here today for a hospital discharge follow-up   Recent hx of stroke-like symptoms followed by 2 day stay in the hospital  Scans were negative for a stroke but a noted 6 mm x 8 mm pseudoaneurysm noted in the cervical ICA  His stroke-like sx resolved  Noted methamphetamines and THC on drug screen but he reports one time use  Hx of smoking cigarettes - approximately 3/4 ppd - issues with oxygen level while inpatient but on room air at time of discharge  He is on plavix x 21 days with ASA with ASA alone thereafter  He also reports he was taken to the [facility] ED the day prior to his stroke-like event (2/4/23)  He reports he passed out and awoke in the ED  He was told he did not have a pulse and they did CPR on him  He states of rib/chest pain after that event  His lipid panel during his hospitalization was with LDL of 118 and other levels normal   He was placed on a statin at time of discharge  He is also asking for a referral for a colonoscopy - reports he had one at age 41 - was told he needed one because his mother had a hx of cancer but he denies it being colon cancer  Review of Systems   Constitutional: Negative  Respiratory: Negative  Cardiovascular: Negative  Neurological: Negative  All other systems reviewed and are negative  Current Outpatient Medications on File Prior to Visit   Medication Sig   • aspirin (ECOTRIN LOW STRENGTH) 81 mg EC tablet Take 1 tablet (81 mg total) by mouth daily Do not start before February 7, 2023  • clopidogrel (PLAVIX) 75 mg tablet Take 1 tablet (75 mg total) by mouth daily Continue to take Plavix and Aspirin together for 19 days, then continue with aspirin daily Do not start before February 7, 2023     • [DISCONTINUED] atorvastatin (LIPITOR) 40 mg tablet Take 1 tablet (40 mg total) by mouth every evening       Objective     /69 (BP Location: Left arm, Patient Position: Sitting, Cuff Size: Adult)   Pulse 76   Temp 97 6 °F (36 4 °C)   Ht 5' 7" (1 702 m)   Wt 58 2 kg (128 lb 3 2 oz)   SpO2 99%   BMI 20 08 kg/m²     Physical Exam  Vitals and nursing note reviewed  Constitutional:       Appearance: Normal appearance  HENT:      Head: Normocephalic and atraumatic  Right Ear: Tympanic membrane, ear canal and external ear normal       Left Ear: Tympanic membrane, ear canal and external ear normal    Eyes:      Conjunctiva/sclera: Conjunctivae normal       Pupils: Pupils are equal, round, and reactive to light  Cardiovascular:      Rate and Rhythm: Normal rate and regular rhythm  Heart sounds: Normal heart sounds  No murmur heard  No gallop  Pulmonary:      Effort: Pulmonary effort is normal  No respiratory distress  Breath sounds: Normal breath sounds  No wheezing or rales  Abdominal:      General: Abdomen is flat  Musculoskeletal:      Cervical back: Neck supple  Neurological:      General: No focal deficit present  Mental Status: He is alert and oriented to person, place, and time  Mental status is at baseline  Cranial Nerves: No cranial nerve deficit  Psychiatric:         Mood and Affect: Mood normal          Behavior: Behavior normal          Thought Content:  Thought content normal          Judgment: Judgment normal        Berle Areas, CRNP

## 2023-03-09 ENCOUNTER — CONSULT (OUTPATIENT)
Dept: VASCULAR SURGERY | Facility: CLINIC | Age: 58
End: 2023-03-09

## 2023-03-09 ENCOUNTER — TELEPHONE (OUTPATIENT)
Dept: VASCULAR SURGERY | Facility: CLINIC | Age: 58
End: 2023-03-09

## 2023-03-09 VITALS
SYSTOLIC BLOOD PRESSURE: 140 MMHG | WEIGHT: 131 LBS | BODY MASS INDEX: 20.56 KG/M2 | HEART RATE: 86 BPM | DIASTOLIC BLOOD PRESSURE: 90 MMHG | HEIGHT: 67 IN

## 2023-03-09 DIAGNOSIS — I67.1 RIGHT INTERNAL CAROTID ARTERY ANEURYSM: Primary | ICD-10-CM

## 2023-03-09 DIAGNOSIS — R29.90 STROKE-LIKE SYMPTOMS: ICD-10-CM

## 2023-03-09 PROBLEM — I72.9 PSEUDOANEURYSM (HCC): Status: RESOLVED | Noted: 2023-02-04 | Resolved: 2023-03-09

## 2023-03-09 RX ORDER — CHLORHEXIDINE GLUCONATE 0.12 MG/ML
15 RINSE ORAL ONCE
OUTPATIENT
Start: 2023-03-09 | End: 2023-03-09

## 2023-03-09 NOTE — PROGRESS NOTES
Assessment/Plan:    Right internal carotid artery aneurysm  61-year-old male with a recent diagnosis of a right internal carotid artery aneurysm  Patient had a recent event in the beginning of February where he had a cardiac arrest with CPR 3 days after methamphetamine use  After he was discharged about a day or 2 later he had slurring of speech and left facial drooping and concern for strokelike symptoms  Patient has never had penetrating trauma or surgery to the neck  He has a history of whiplash which required a cervical collar when he was 27years old  This could be one of the reasons for the pseudoaneurysm formation  Unsure if the stroke-like symptoms were related to the cardiac arrest or to his aneurysm  Regardless the aneurysm/pseudoaneurysm will need to be repaired due to risk of stroke or rupture  On CT there appears to be significant redundancy of the right internal carotid artery  For that reason I will plan for right carotid pseudoaneurysm excision with primary repair  We will also plan for possible GSV harvest if end-to-end anastomosis not possible    Plan for cardiac clearance prior to surgery given his recent cardiac arrest       Diagnoses and all orders for this visit:    Right internal carotid artery aneurysm  -     Case request operating room: EXPLORATION CAROTID ARTERY, excision of right carotid aneurysm with repair, possible saphenous vein harvest; Standing  -     Basic metabolic panel; Future  -     CBC and Platelet; Future  -     Type and screen; Future  -     Protime-INR; Future  -     Ambulatory referral to Cardiology; Future  -     Case request operating room: EXPLORATION CAROTID ARTERY, excision of right carotid aneurysm with repair, possible saphenous vein harvest    Stroke-like symptoms  -     Ambulatory Referral to Vascular Surgery    Other orders  -     Diet NPO; Sips with meds; Standing  -     Void on call to OR; Standing  -     Insert peripheral IV;  Standing  - Nursing Communication CHG bath, have staff wash entire body (neck down) per pre op bathing protocol  Routine, evening prior to, and day of surgery ; Standing  -     Nursing Communication Swab both nares with Povidone-Iodine solution, EXCLUDE if patient has shellfish/Iodine allergy, and replace with nasal alcohol swabstick  Routine, day of surgery, on call to OR ; Standing  -     chlorhexidine (PERIDEX) 0 12 % oral rinse 15 mL  -     Place sequential compression device; Standing        Subjective:      Patient ID: Ruiz Mcdonald is a 56 y o  male  Patient is new to our practice referred by Raegan Wagner PA-C  Patient c/o SOB some times  No chest pain, tightness or numbness  Patient is currently taking ASA, Atorvastatin and Plavix  Patient smokes 0 5PPD  HPI  53-year-old gentleman presenting to the office as a referral due to strokelike symptoms and findings of a right internal carotid artery pseudoaneurysm on CT scan  Patient states about 3 to 4 weeks ago he was at his friend's house where he had a cardiac arrest required CPR and ROSC was achieved in the field  He states 3 days prior to that he did a line of meth which may have been a contributor to his cardiac arrest   He states about a day or 2 after that event he was done to his wife where she noted some slurred speech and left facial drooping  He had no weakness in the upper or lower extremities  CT scan was done did not show a stroke but did show a right carotid pseudoaneurysm  Discussed with patient his past he has had a history of drug use roughly 20 years ago when he used methamphetamines more regularly  He injected in his left arm twice and has never had injections in his neck  Never had any penetrating trauma or surgery to the neck  States he did have whiplash at the age of 27 that required him to be in a cervical collar      Patient has a history of smoking of half a pack a day for 20 years    The following portions of the patient's history were reviewed and updated as appropriate: allergies, current medications, past family history, past medical history, past social history, past surgical history and problem list     I have spent a total time of 45 minutes on 03/09/23 in caring for this patient including Diagnostic results, Prognosis, Risks and benefits of tx options, Instructions for management, Patient and family education, Importance of tx compliance, Risk factor reductions, Impressions, Counseling / Coordination of care, Documenting in the medical record, Reviewing / ordering tests, medicine, procedures   and Obtaining or reviewing history    Review of Systems   Respiratory: Shortness of breath: at times  Objective:      /90 (BP Location: Right arm, Patient Position: Sitting, Cuff Size: Adult)   Pulse 86   Ht 5' 7" (1 702 m)   Wt 59 4 kg (131 lb)   BMI 20 52 kg/m²          Physical Exam  Constitutional:       Appearance: Normal appearance  HENT:      Head: Normocephalic and atraumatic  Eyes:      Extraocular Movements: Extraocular movements intact  Pupils: Pupils are equal, round, and reactive to light  Cardiovascular:      Rate and Rhythm: Normal rate and regular rhythm  Pulmonary:      Effort: Pulmonary effort is normal       Breath sounds: Normal breath sounds  Abdominal:      Palpations: Abdomen is soft  Tenderness: There is no abdominal tenderness  Musculoskeletal:         General: Normal range of motion  Cervical back: Normal range of motion  Neurological:      General: No focal deficit present  Mental Status: He is oriented to person, place, and time     Psychiatric:         Mood and Affect: Mood normal          Operative Scheduling Information:    Hospital:  [facility]    Physician:  Karen Nieto    Surgery: Right carotid artery excision with primary repair, possible GSV harvest    Urgency:  Standard    Level:  Level 4: Outpatients to be scheduled for screening procedures and elective surgery that can be delayed for longer than one month without reasonable expectation of detriment to patient      Case Length:  Normal    Post-op Bed:  Stepdown    OR Table:  Standard    Equipment Needs:  EEG monitoring    Medication Instructions:  Aspirin:   Continue (do not hold)  Plavix:  Continue (do not hold)    Hydration:  No    Contrast Allergy:  no

## 2023-03-09 NOTE — LETTER
23    Re: Cardiology  Clearance    Patient Name: Deborah Wyman   Patient : 1965   Patient MRN: [id number]   Patient Phone: [phone]     Dr Eboni Soler MD is requesting clearance for above patient, prior to proceeding with  Right carotid artery excision with primary repair, possible GSV harvest   Patient's procedure will be scheduled at [place] [id number] once clearance has been obtained  Patient will be given general anesthesia  We spoke with your office today regarding clearance request   Angel Villavicencio has scheduled patient's clearance appointment for 3/15/2023 at 2:40 PM in your [address] office  Please fax your recommendations, including any medication recommendations, to [phone], attention nursing  Or route your recommendations to Epic pool [facility] Clearance Pool [[id number]]  Please reach out with any questions or concerns      Sincerely,    [facility]

## 2023-03-09 NOTE — ASSESSMENT & PLAN NOTE
62-year-old male with a recent diagnosis of a right internal carotid artery aneurysm  Patient had a recent event in the beginning of February where he had a cardiac arrest with CPR 3 days after methamphetamine use  After he was discharged about a day or 2 later he had slurring of speech and left facial drooping and concern for strokelike symptoms  Patient has never had penetrating trauma or surgery to the neck  He has a history of whiplash which required a cervical collar when he was 27years old  This could be one of the reasons for the pseudoaneurysm formation  Unsure if the stroke-like symptoms were related to the cardiac arrest or to his aneurysm  Regardless the aneurysm/pseudoaneurysm will need to be repaired due to risk of stroke or rupture  On CT there appears to be significant redundancy of the right internal carotid artery  For that reason I will plan for right carotid pseudoaneurysm excision with primary repair    We will also plan for possible GSV harvest if end-to-end anastomosis not possible    Plan for cardiac clearance prior to surgery given his recent cardiac arrest

## 2023-03-09 NOTE — TELEPHONE ENCOUNTER
REMINDER: Under Reason For Call, comments MUST be formatted as:   (Surgeon's Initials) / (Procedure)      Special Instructions / FYI:    Procedure:  Right carotid artery excision with primary repair, possible GSV harvest    Level: 4 - Route clearance(s) to [facility] Surgery Coordinator Pool    Allergies: Bee venom and Penicillins    Instructions Given: NO Bowel Prep General Instructions     Dialysis: Patient is not on dialysis  Return Visit Required Prior to Procedure: No     Consent: I certify that patient has signed, printed, timed, and dated their surgery consent  I certify that the patient's LEGAL NAME and DATE OF BIRTH are written in the upper left corner on BOTH sides of the consent  I certify that BOTH sides of the completed surgery consent have been scanned into the patient's Epic chart by myself on 3/9/2023  Yes, I have LABELED the consent in Epic as Consent for Vascular Procedure  For Surgical Clearances     Levels   1-3   ROUTE this encounter to [facility] Clearance Pool (AND)   [facility] Surgery Coordinator Pool     Level   4   ROUTE this encounter to [facility] Surgery Coordinator Pool       HYDRATION CLEARANCES   ONLY ROUTE TO  [facility] Clearance Pool     (1) ONE CLEARANCE NEEDED - Cardiology  Clearance // Clearing Provider's Name (First & Last): Ana Krishna //  Spoke with: Damir Crowell  //  Office Contact Information P: [phone] - F: Juan Ballard    Yes, I have ROUTED this encounter to [facility] Surgery Coordinator and/or [facility] Clearance Pool

## 2023-03-15 ENCOUNTER — OFFICE VISIT (OUTPATIENT)
Dept: CARDIOLOGY CLINIC | Facility: CLINIC | Age: 58
End: 2023-03-15

## 2023-03-15 VITALS
DIASTOLIC BLOOD PRESSURE: 70 MMHG | BODY MASS INDEX: 20.48 KG/M2 | WEIGHT: 130.5 LBS | HEIGHT: 67 IN | HEART RATE: 82 BPM | SYSTOLIC BLOOD PRESSURE: 120 MMHG

## 2023-03-15 DIAGNOSIS — Z01.810 PREOPERATIVE CARDIOVASCULAR EXAMINATION: Primary | ICD-10-CM

## 2023-03-15 DIAGNOSIS — R29.90 STROKE-LIKE SYMPTOMS: ICD-10-CM

## 2023-03-15 DIAGNOSIS — F19.10 SUBSTANCE ABUSE (HCC): ICD-10-CM

## 2023-03-15 DIAGNOSIS — E78.5 HYPERLIPIDEMIA, UNSPECIFIED HYPERLIPIDEMIA TYPE: ICD-10-CM

## 2023-03-15 DIAGNOSIS — I72.0 CAROTID PSEUDOANEURYSM (HCC): ICD-10-CM

## 2023-03-15 DIAGNOSIS — Z72.0 TOBACCO ABUSE: ICD-10-CM

## 2023-03-15 RX ORDER — CLOPIDOGREL BISULFATE 75 MG/1
75 TABLET ORAL DAILY
Qty: 30 TABLET | Refills: 0 | Status: SHIPPED | OUTPATIENT
Start: 2023-03-15

## 2023-03-15 NOTE — PROGRESS NOTES
Cardiology Consultation Visit       Cata Kessler   [id number]  1965      [facility]ULAR [facility] [address]      Physician Requesting Consult:   No att  providers found  Violet Pitts    Reason for Consultation / Principal Problem:  Mr Cata Kessler is a 56 y o  male and current smoker with a PMH significant for but not limited to Right Internal Carotid Aneurysm, Prediabetes, HLD, Substance Abuse (abstaining now, prior THC/Methamphetamine),   He presents to clinic for Preoperative Risk Stratification  History of Present Illness:  Mr Sergio Reed was at his baseline state of health: independent of adl's, working as a , though not exercising regularly, when they developed a sudden onset of LOC with subsequent slurring/unsteadiness  He was at work when the incident began  CPR was given by coworkers and EMS was called  The patient originally left the ED AMA, but returned for progressive dysphasia and disequilibrium  He was found to have a R ICA aneurysm, and he has since been followed by Vascular Surgery for planned pseudoaneurysm excision with primary repair  He notes occasional headaches, but currently denies any cp, diaphoresis, n/v, sob, morfin, palpitations, near syncope, syncope, orthopnea, pnd, or ble edema  He denies any recent hospitalizations since his initial diagnosis, prior cardiac history, or family history of scd  His mother, however,  of an MI at age 46  He notes inconsistent control of his diet and exercise habits  He reports a diet that is somewhat balanced but has room for improvement, sufficient daily water intake and no regular exercise  He is otherwise compliant with medications but does not maintain a detailed BP log    Of note, the patient's cardiac risk factor(s) include: advanced age (older than 54 for men, 72 for women), dyslipidemia, family history of premature cardiovascular disease, sedentary lifestyle and smoking or tobacco exposure  Assessment & Plan   1  Preoperative Cardiac Risk Stratification for pseudoaneurysm excision with primary repair  a  Functionally, the patient is able to perform adls and reports excellent functional capacity, tolerating more than 6 METs without anginal complaints  b  Clinical exam is unremarkable for acute cv instability or illness, Review of the patient's prior cardiac testing shows no new findings requiring further workup: Recent EKG on 02/04/23 shows no pathological Q waves or new BBB , Prior TTE on 02/06/23 shows no new systolic LV Dysfunction or RWMA  c  The patient's [facility] preoperative analysis yields a 0 2% risk of myocardial infarction or cardiac arrest, intraoperatively or up to 30 days post-op  d  At this time, the patient is deemed a low risk and may proceed to surgery without further cardiac testing , Regarding preoperative medications, DAPT to be held per vascular surgery    2  Right Internal Carotid Pseudoaneursym  a  Aymtpomatic currently, bp well controlled  b  Cont current medmgmt per vascular on DAPT/statin  c  Monitoring routinely for symptomatic change and for bp control    3  HLD, most recent FLP: , TRI 64, HDL 55, and  on 02/05/23  a  Tolerating statin without significant adverse reactions  b  Cont current medication regimen, cont counseling on diet and lifestyle modification  c  Monitor FLP routinely as ordered by PCP, will follow peripherally    4  Tobacco Dependence  a  Patient continues smoking daily but is beginning to start cutting back  b  Patient declines medical management at this time,  on continued attempts at smoking cessation  c  Monitor routinely for progress    Discussion / Summary:  Precautions and reasons to call our office or proceed to ER were discussed in detail  Patient expressed understanding and questions were answered  Plan on follow up in-clinic in 6 months      Office Visit Diagnosis:  1  Preoperative cardiovascular examination        2  Carotid pseudoaneurysm (HCC)        3  Stroke-like symptoms  clopidogrel (PLAVIX) 75 mg tablet      4  Hyperlipidemia, unspecified hyperlipidemia type        5  Substance abuse ([address] )        6  Tobacco abuse            Subjective   Review of Systems   Constitutional: Negative for chills and fever  HENT: Negative for rhinorrhea and sore throat  Eyes: Negative for pain and visual disturbance  Respiratory: Positive for cough (smoker's cough)  Negative for shortness of breath and wheezing  Cardiovascular: Negative for chest pain, palpitations and leg swelling  Gastrointestinal: Negative for abdominal pain, constipation, diarrhea and vomiting  Endocrine: Negative for cold intolerance, heat intolerance and polyuria  Genitourinary: Negative for dysuria and hematuria  Musculoskeletal: Negative for arthralgias and joint swelling  Skin: Positive for rash (lle excoriations due to itching)  Neurological: Positive for headaches (one ha since the incident, none since)  Negative for light-headedness and numbness  Hematological: Does not bruise/bleed easily  Psychiatric/Behavioral: Negative for dysphoric mood  The patient is not nervous/anxious  The following portions of the patient's history were reviewed and updated as appropriate: past medical history, past social history, past family history, past surgical history, current medications, allergies, past surgical history and problem list   No past medical history on file    Social History     Socioeconomic History   • Marital status:      Spouse name: Not on file   • Number of children: Not on file   • Years of education: Not on file   • Highest education level: Not on file   Occupational History   • Not on file   Tobacco Use   • Smoking status: Every Day     Packs/day: 1 00     Types: Cigarettes   • Smokeless tobacco: Never   Vaping Use   • Vaping Use: Never used Substance and Sexual Activity   • Alcohol use: Not Currently   • Drug use: Yes     Types: Marijuana   • Sexual activity: Not Currently   Other Topics Concern   • Not on file   Social History Narrative   • Not on file     Social Determinants of Health     Financial Resource Strain: Not on file   Food Insecurity: Not on file   Transportation Needs: Not on file   Physical Activity: Not on file   Stress: Not on file   Social Connections: Not on file   Intimate Partner Violence: Not on file   Housing Stability: Not on file     No family history on file  No past surgical history on file  Current Outpatient Medications:   •  aspirin (ECOTRIN LOW STRENGTH) 81 mg EC tablet, Take 1 tablet (81 mg total) by mouth daily Do not start before February 7, 2023 , Disp: 30 tablet, Rfl: 0  •  atorvastatin (LIPITOR) 20 mg tablet, Take 1 tablet (20 mg total) by mouth every evening, Disp: 30 tablet, Rfl: 2  •  clopidogrel (PLAVIX) 75 mg tablet, Take 1 tablet (75 mg total) by mouth daily Continue to take Plavix and Aspirin together for 19 days, then continue with aspirin daily Do not start before February 7, 2023  (Patient not taking: Reported on 3/15/2023), Disp: 19 tablet, Rfl: 0  Allergies   Allergen Reactions   • Bee Venom Anaphylaxis   • Penicillins Anaphylaxis     Patient Active Problem List   Diagnosis   • Nicotine abuse   • Drug usage   • Right internal carotid artery aneurysm       Objective     Vitals:    03/15/23 1446   BP: 120/70   BP Location: Right arm   Patient Position: Sitting   Cuff Size: Standard   Pulse: 82   Weight: 59 2 kg (130 lb 8 oz)   Height: 5' 7" (1 702 m)     Body mass index is 20 44 kg/m²  Physical Exam  Constitutional:       General: He is not in acute distress  Appearance: He is well-developed and normal weight  He is not toxic-appearing  HENT:      Head: Normocephalic and atraumatic        Right Ear: External ear normal       Left Ear: External ear normal    Eyes:      General: No scleral icterus  Right eye: No discharge  Left eye: No discharge  Conjunctiva/sclera: Conjunctivae normal    Neck:      Vascular: No carotid bruit  Cardiovascular:      Rate and Rhythm: Normal rate and regular rhythm  Pulses: Normal pulses  Heart sounds: Normal heart sounds  No murmur heard  No gallop  Pulmonary:      Effort: Pulmonary effort is normal       Breath sounds: Normal breath sounds  Musculoskeletal:      Cervical back: Neck supple  Right lower leg: No edema  Left lower leg: No edema  Skin:     General: Skin is warm and dry  Capillary Refill: Capillary refill takes less than 2 seconds  Coloration: Skin is not jaundiced  Findings: No bruising or lesion  Neurological:      General: No focal deficit present  Mental Status: He is alert and oriented to person, place, and time     Psychiatric:         Mood and Affect: Mood normal          Behavior: Behavior normal          Labs:  Lab Results   Component Value Date    K 4 1 02/06/2023    K 4 6 02/05/2023    K 4 8 02/04/2023     02/06/2023    CL 97 02/05/2023     02/04/2023    CO2 32 02/06/2023    CO2 36 (H) 02/05/2023    CO2 31 02/04/2023    BUN 13 02/06/2023    BUN 17 02/05/2023    BUN 19 02/04/2023    CREATININE 0 84 02/06/2023    CREATININE 0 89 02/05/2023    CREATININE 0 97 02/04/2023    EGFR 98 02/06/2023    EGFR 96 02/05/2023    EGFR 87 02/04/2023    GLUC 94 02/06/2023    GLUC 105 02/05/2023    GLUC 111 02/04/2023    MG 2 1 02/06/2023    MG 2 2 02/05/2023    CALCIUM 9 0 02/06/2023    CALCIUM 8 7 02/05/2023    CALCIUM 9 2 02/04/2023      Lab Results   Component Value Date    WBC 9 41 02/05/2023    WBC 12 23 (H) 02/04/2023    HGB 14 0 02/05/2023    HGB 14 1 02/04/2023    HCT 43 7 02/05/2023    HCT 44 5 02/04/2023     02/05/2023     02/04/2023    INR 1 07 02/04/2023      Lab Results   Component Value Date    CHOLESTEROL 186 02/05/2023    TRIG 64 02/05/2023    HDL 55 02/05/2023    LDLCALC 118 (H) 02/05/2023     Lab Results   Component Value Date    HGBA1C 5 8 (H) 02/04/2023    POCGLU 111 02/04/2023     No results found for: TSH, FT4  Lab Results   Component Value Date    HSTNI0 5 02/04/2023    HSTNI2 5 02/04/2023    HSTNI4 4 02/04/2023     No results found for: BNP, NTBNP     Imaging:  I have personally reviewed pertinent reports  No results found  Cardiac Studies:  EKG:   Date: 02/04/23, normal sinus rhythm    Tele:   No results found for this or any previous visit  Holter:   No results found for this or any previous visit  Previous Cath/PCI:  No results found for this or any previous visit  Previous STRESS TEST:  No results found for this or any previous visit  ECHO:  Results for orders placed during the hospital encounter of 02/04/23  Echo complete w/ contrast if indicated  Interpretation Summary  •  Left Ventricle: Normal left ventricular wall thickness, cavity size, and systolic function  Estimated ejection fraction 72%  No regional wall motion normality identified  Normal end-diastolic pressures estimated with mild diastolic relaxation abnormality  •  Right Ventricle: Normal right ventricular size and function  •  Atrial Septum: No patent foramen ovale detected  •  Aortic Valve: Trileaflet, mildly thickened aortic valve with normal leaflet excursion and flow patterns  •  Mitral Valve: Mild thickening of the mitral leaflets with preserved excursion and trace insufficiency  •  Tricuspid Valve: Normal-appearing tricuspid leaflets with trace insufficiency  Measured tricuspid regurgitant velocity of 2 5 m/s corresponds to gradient of 26 mmHg  With addition of 10 mmHg for estimated right atrial pressure, estimated pulmonary pressure is top normal to mildly creased to 36 mmHg  No prior study for comparison  EMILY:  No results found for this or any previous visit  CMR:  No results found for this or any previous visit      Counseling / Coordination of Care:  During our visit, I spent 20 minutes with the patient, and greater than 60% of this time was spent on counseling and coordination of care, including addressing diagnostic results, prognosis, risks and benefits of treatment options, instructions for management, patient/family education, importance of treatment compliance, along with risk factor reductions  Dictation Disclaimer: This note was completed in part utilizing [employer] direct voice recognition software  Grammatical errors, random word insertion, spelling mistakes, and incomplete sentences may be an occasional consequence of the system secondary to software limitations, ambient noise and hardware issues  At the time of dictation, efforts were made to edit, clarify and /or correct errors  Please read the chart carefully and recognize, using context, where substitutions have occurred  If you have any questions or concerns about the context, text or information contained within the body of this dictation, please contact myself, the provider, for further clarification       Krissy Choe, DO 03/15/23

## 2023-03-16 NOTE — TELEPHONE ENCOUNTER
Patient is cleared  See office note 3/15/23    a   At this time, the patient is deemed a low risk and may proceed to surgery without further cardiac testing , Regarding preoperative medications, DAPT to be held per vascular surgery

## 2023-03-24 ENCOUNTER — APPOINTMENT (OUTPATIENT)
Dept: LAB | Facility: CLINIC | Age: 58
End: 2023-03-24

## 2023-03-24 ENCOUNTER — LAB REQUISITION (OUTPATIENT)
Dept: LAB | Facility: HOSPITAL | Age: 58
End: 2023-03-24

## 2023-03-24 ENCOUNTER — PREP FOR PROCEDURE (OUTPATIENT)
Dept: VASCULAR SURGERY | Facility: CLINIC | Age: 58
End: 2023-03-24

## 2023-03-24 DIAGNOSIS — I67.1 RIGHT INTERNAL CAROTID ARTERY ANEURYSM: ICD-10-CM

## 2023-03-24 DIAGNOSIS — I67.1 CEREBRAL ANEURYSM, NONRUPTURED: ICD-10-CM

## 2023-03-24 LAB
ABO GROUP BLD: NORMAL
ANION GAP SERPL CALCULATED.3IONS-SCNC: -1 MMOL/L (ref 4–13)
BLD GP AB SCN SERPL QL: NEGATIVE
BUN SERPL-MCNC: 23 MG/DL (ref 5–25)
CALCIUM SERPL-MCNC: 9.5 MG/DL (ref 8.3–10.1)
CHLORIDE SERPL-SCNC: 106 MMOL/L (ref 96–108)
CO2 SERPL-SCNC: 30 MMOL/L (ref 21–32)
CREAT SERPL-MCNC: 1.17 MG/DL (ref 0.6–1.3)
ERYTHROCYTE [DISTWIDTH] IN BLOOD BY AUTOMATED COUNT: 14.7 % (ref 11.6–15.1)
GFR SERPL CREATININE-BSD FRML MDRD: 69 ML/MIN/1.73SQ M
GLUCOSE P FAST SERPL-MCNC: 103 MG/DL (ref 65–99)
HCT VFR BLD AUTO: 50.4 % (ref 36.5–49.3)
HGB BLD-MCNC: 16.2 G/DL (ref 12–17)
INR PPP: 1.02 (ref 0.84–1.19)
MCH RBC QN AUTO: 29.2 PG (ref 26.8–34.3)
MCHC RBC AUTO-ENTMCNC: 32.1 G/DL (ref 31.4–37.4)
MCV RBC AUTO: 91 FL (ref 82–98)
PLATELET # BLD AUTO: 284 THOUSANDS/UL (ref 149–390)
PMV BLD AUTO: 9.9 FL (ref 8.9–12.7)
POTASSIUM SERPL-SCNC: 5.4 MMOL/L (ref 3.5–5.3)
PROTHROMBIN TIME: 13.6 SECONDS (ref 11.6–14.5)
RBC # BLD AUTO: 5.54 MILLION/UL (ref 3.88–5.62)
RH BLD: POSITIVE
SODIUM SERPL-SCNC: 135 MMOL/L (ref 135–147)
SPECIMEN EXPIRATION DATE: NORMAL
WBC # BLD AUTO: 6.92 THOUSAND/UL (ref 4.31–10.16)

## 2023-04-07 ENCOUNTER — ANESTHESIA (INPATIENT)
Dept: PERIOP | Facility: HOSPITAL | Age: 58
DRG: 253 | End: 2023-04-07
Payer: COMMERCIAL

## 2023-04-07 ENCOUNTER — ANESTHESIA EVENT (INPATIENT)
Dept: PERIOP | Facility: HOSPITAL | Age: 58
DRG: 253 | End: 2023-04-07
Payer: COMMERCIAL

## 2023-04-07 RX ORDER — SUCCINYLCHOLINE/SOD CL,ISO/PF 100 MG/5ML
SYRINGE (ML) INTRAVENOUS AS NEEDED
Status: DISCONTINUED | OUTPATIENT
Start: 2023-04-07 | End: 2023-04-07

## 2023-04-07 RX ORDER — LIDOCAINE HYDROCHLORIDE 10 MG/ML
INJECTION, SOLUTION EPIDURAL; INFILTRATION; INTRACAUDAL; PERINEURAL AS NEEDED
Status: DISCONTINUED | OUTPATIENT
Start: 2023-04-07 | End: 2023-04-07

## 2023-04-07 RX ORDER — CEFAZOLIN SODIUM 1 G/3ML
INJECTION, POWDER, FOR SOLUTION INTRAMUSCULAR; INTRAVENOUS AS NEEDED
Status: DISCONTINUED | OUTPATIENT
Start: 2023-04-07 | End: 2023-04-07

## 2023-04-07 RX ORDER — ONDANSETRON 2 MG/ML
INJECTION INTRAMUSCULAR; INTRAVENOUS AS NEEDED
Status: DISCONTINUED | OUTPATIENT
Start: 2023-04-07 | End: 2023-04-07

## 2023-04-07 RX ORDER — PROPOFOL 10 MG/ML
INJECTION, EMULSION INTRAVENOUS AS NEEDED
Status: DISCONTINUED | OUTPATIENT
Start: 2023-04-07 | End: 2023-04-07

## 2023-04-07 RX ORDER — ROCURONIUM BROMIDE 10 MG/ML
INJECTION, SOLUTION INTRAVENOUS AS NEEDED
Status: DISCONTINUED | OUTPATIENT
Start: 2023-04-07 | End: 2023-04-07

## 2023-04-07 RX ORDER — FENTANYL CITRATE 50 UG/ML
INJECTION, SOLUTION INTRAMUSCULAR; INTRAVENOUS AS NEEDED
Status: DISCONTINUED | OUTPATIENT
Start: 2023-04-07 | End: 2023-04-07

## 2023-04-07 RX ADMIN — ONDANSETRON 4 MG: 2 INJECTION INTRAMUSCULAR; INTRAVENOUS at 18:07

## 2023-04-07 RX ADMIN — Medication 100 MG: at 17:14

## 2023-04-07 RX ADMIN — CEFAZOLIN SODIUM 1000 MG: 1 INJECTION, POWDER, FOR SOLUTION INTRAMUSCULAR; INTRAVENOUS at 17:11

## 2023-04-07 RX ADMIN — NOREPINEPHRINE BITARTRATE 2 MCG/MIN: 1 INJECTION, SOLUTION, CONCENTRATE INTRAVENOUS at 17:14

## 2023-04-07 RX ADMIN — FENTANYL CITRATE 100 MCG: 50 INJECTION INTRAMUSCULAR; INTRAVENOUS at 17:09

## 2023-04-07 RX ADMIN — SUGAMMADEX 150 MG: 100 INJECTION, SOLUTION INTRAVENOUS at 18:14

## 2023-04-07 RX ADMIN — LIDOCAINE HYDROCHLORIDE 50 MG: 10 INJECTION, SOLUTION EPIDURAL; INFILTRATION; INTRACAUDAL; PERINEURAL at 17:14

## 2023-04-07 RX ADMIN — ROCURONIUM BROMIDE 30 MG: 10 INJECTION INTRAVENOUS at 17:28

## 2023-04-07 RX ADMIN — PROPOFOL 100 MG: 10 INJECTION, EMULSION INTRAVENOUS at 17:14

## 2023-04-07 NOTE — ANESTHESIA PREPROCEDURE EVALUATION
Procedure:  EXPLORATION NECK (Right: Neck)    Relevant Problems   No relevant active problems        Physical Exam    Airway    Mallampati score: II  TM Distance: >3 FB  Neck ROM: full     Dental       Cardiovascular      Pulmonary      Other Findings        Anesthesia Plan  ASA Score- 3 Emergent    Anesthesia Type- general with ASA Monitors  Additional Monitors: arterial line  Airway Plan: ETT  Comment: Neck exploration s/p carotid surgery  RSI 2/2 full stomach (soup and crackers)          Plan Factors-Exercise tolerance (METS): >4 METS  Chart reviewed  Patient is a current smoker  Patient smoked on day of surgery  Obstructive sleep apnea risk education given perioperatively  Induction- intravenous and rapid sequence induction  Postoperative Plan- Plan for postoperative opioid use  Planned trial extubation    Informed Consent- Anesthetic plan and risks discussed with patient  I personally reviewed this patient with the CRNA  Discussed and agreed on the Anesthesia Plan with the VIVI Kerr Anesthesia plan and consent discussed with Nadia Marr who expressed understanding and agreement  Risks/benefits and alternatives discussed with patient including possible PONV, sore throat, damage to teeth/lips/gums and possibility of rare anesthetic and surgical emergencies

## 2023-04-07 NOTE — ANESTHESIA POSTPROCEDURE EVALUATION
Post-Op Assessment Note    CV Status:  Stable  Pain Score: 0    Pain management: adequate     Mental Status:  Alert and awake   Hydration Status:  Euvolemic   PONV Controlled:  Controlled   Airway Patency:  Patent      Post Op Vitals Reviewed: Yes      Staff: CRNA         No notable events documented      BP (!) 180/64 (04/07/23 1831)    Temp      Pulse 70 (04/07/23 1831)   Resp 14 (04/07/23 1831)    SpO2 96 % (04/07/23 1831)

## 2023-05-09 ENCOUNTER — RA CDI HCC (OUTPATIENT)
Dept: OTHER | Facility: HOSPITAL | Age: 58
End: 2023-05-09

## 2023-05-09 NOTE — PROGRESS NOTES
[facility]  coding opportunities       Chart reviewed, no opportunity found: CHART REVIEWED, NO OPPORTUNITY FOUND   This is a reminder to address ALL HCC (risk adjustment) codes as found on active problem list for 2023 as patient scores reset to zero JONATHAN       Patients Insurance        Commercial Insurance: [address] [place] [address]

## 2023-05-12 NOTE — PROGRESS NOTES
Name: Shanique Genao      : 1965      MRN: [id number]  Encounter Provider: MELISSA Llamas  Encounter Date: 5/15/2023   Encounter department: [address] [place] [address] PRIMARY CARE    Assessment & Plan     1  Right internal carotid artery aneurysm    2  Screening for colon cancer  -     Ambulatory referral to Gastroenterology; Future    3  Mixed hyperlipidemia  -     Lipid panel; Future        Tobacco Cessation Counseling: Tobacco cessation counseling was not provided  The patient is sincerely urged to quit consumption of tobacco  He is not ready to quit tobacco  Has decreased the amount he smokes down to 5-6 cigarettes/day from 1 ppd  Repeat lipid panel ordered to recheck levels prior to annual exam in 4 months  Subjective      Here today for a follow-up  Recent repair of a right internal carotid artery aneurysm by vascular on 2023  Reports overall he is doing well - states of some numbness at the site of surgery but that is it  Hx of mild LDL cholesterol elevation - start on a statin with a previous hospitalization in 2023 which is when they found his carotid aneurysm  He is almost finished with the statin and would like to stop it for an interim and then recheck his labs  Review of Systems   Constitutional: Negative  Respiratory: Negative  Cardiovascular: Negative  Neurological: Negative  All other systems reviewed and are negative  Current Outpatient Medications on File Prior to Visit   Medication Sig   • aspirin (ECOTRIN LOW STRENGTH) 81 mg EC tablet Take 1 tablet (81 mg total) by mouth daily Do not start before 2023     • atorvastatin (LIPITOR) 20 mg tablet Take 1 tablet (20 mg total) by mouth every evening   • nicotine (NICODERM CQ) 14 mg/24hr TD 24 hr patch Place 1 patch on the skin over 24 hours every 24 hours       Objective     /82 (BP Location: Left arm, Patient Position: Sitting, Cuff Size: Standard)   Pulse 75   Temp "97 8 °F (36 6 °C)   Ht 5' 7\" (1 702 m)   Wt 57 7 kg (127 lb 3 2 oz)   SpO2 98%   BMI 19 92 kg/m²     Physical Exam  Vitals and nursing note reviewed  Constitutional:       Appearance: Normal appearance  HENT:      Head: Normocephalic and atraumatic  Eyes:      Conjunctiva/sclera: Conjunctivae normal    Cardiovascular:      Rate and Rhythm: Normal rate and regular rhythm  Heart sounds: Normal heart sounds  No murmur heard  No gallop  Pulmonary:      Effort: Pulmonary effort is normal  No respiratory distress  Breath sounds: Normal breath sounds  No wheezing or rales  Abdominal:      General: Abdomen is flat  Musculoskeletal:      Cervical back: Neck supple  Neurological:      General: No focal deficit present  Mental Status: He is alert and oriented to person, place, and time  Mental status is at baseline  Cranial Nerves: No cranial nerve deficit  Psychiatric:         Mood and Affect: Mood normal          Behavior: Behavior normal          Thought Content:  Thought content normal          Judgment: Judgment normal        MELISSA Juarez  "

## 2023-05-15 ENCOUNTER — TELEPHONE (OUTPATIENT)
Dept: FAMILY MEDICINE CLINIC | Facility: CLINIC | Age: 58
End: 2023-05-15

## 2023-05-15 ENCOUNTER — OFFICE VISIT (OUTPATIENT)
Dept: FAMILY MEDICINE CLINIC | Facility: CLINIC | Age: 58
End: 2023-05-15

## 2023-05-15 VITALS
BODY MASS INDEX: 19.97 KG/M2 | OXYGEN SATURATION: 98 % | HEIGHT: 67 IN | SYSTOLIC BLOOD PRESSURE: 128 MMHG | WEIGHT: 127.2 LBS | TEMPERATURE: 97.8 F | HEART RATE: 75 BPM | DIASTOLIC BLOOD PRESSURE: 82 MMHG

## 2023-05-15 DIAGNOSIS — I67.1 RIGHT INTERNAL CAROTID ARTERY ANEURYSM: Primary | ICD-10-CM

## 2023-05-15 DIAGNOSIS — Z12.11 SCREENING FOR COLON CANCER: ICD-10-CM

## 2023-05-15 DIAGNOSIS — E78.2 MIXED HYPERLIPIDEMIA: ICD-10-CM

## 2023-07-14 ENCOUNTER — HOSPITAL ENCOUNTER (OUTPATIENT)
Dept: NON INVASIVE DIAGNOSTICS | Facility: HOSPITAL | Age: 58
Discharge: HOME | End: 2023-07-14
Payer: COMMERCIAL

## 2023-07-14 DIAGNOSIS — I67.1 RIGHT INTERNAL CAROTID ARTERY ANEURYSM: ICD-10-CM

## 2023-07-14 PROCEDURE — 93880 EXTRACRANIAL BILAT STUDY: CPT

## 2023-07-15 PROCEDURE — 93880 EXTRACRANIAL BILAT STUDY: CPT | Performed by: SURGERY

## 2023-08-02 ENCOUNTER — OFFICE VISIT (OUTPATIENT)
Dept: VASCULAR SURGERY | Facility: CLINIC | Age: 58
End: 2023-08-02
Payer: COMMERCIAL

## 2023-08-02 VITALS
DIASTOLIC BLOOD PRESSURE: 88 MMHG | HEART RATE: 63 BPM | RESPIRATION RATE: 20 BRPM | HEIGHT: 67 IN | SYSTOLIC BLOOD PRESSURE: 140 MMHG | OXYGEN SATURATION: 98 % | BODY MASS INDEX: 19.93 KG/M2 | WEIGHT: 127 LBS

## 2023-08-02 DIAGNOSIS — R29.90 STROKE-LIKE SYMPTOMS: ICD-10-CM

## 2023-08-02 DIAGNOSIS — I72.0 CAROTID ARTERY ANEURYSM (HCC): Primary | ICD-10-CM

## 2023-08-02 PROCEDURE — 99214 OFFICE O/P EST MOD 30 MIN: CPT | Performed by: SURGERY

## 2023-08-02 RX ORDER — ATORVASTATIN CALCIUM 20 MG/1
20 TABLET, FILM COATED ORAL EVERY EVENING
Qty: 30 TABLET | Refills: 2 | Status: SHIPPED | OUTPATIENT
Start: 2023-08-02

## 2023-08-02 NOTE — PROGRESS NOTES
Assessment/Plan:    Carotid artery aneurysm [facility])  70-year-old male status post excision of a right ICA aneurysm with end-to-end anastomosis on 4/7/2023    Patient's recent duplex shows normal velocities throughout the carotid artery. Patient is still active smoker. Patient has stopped taking Lipitor due to concern of the ingredients in the medication. Discussed with the patient in detail the safety and benefit of the medication is an active smoker is with arterial disease and I again highly recommend he continue taking Lipitor 20 mg.  I also recommend he continue with his aspirin 81 mg. He felt more comfortable after our discussion and has asked for a new prescription so he can resume taking it. We will have the patient follow-up in 6 months with a duplex at that time. Diagnoses and all orders for this visit:    Carotid artery aneurysm ([address])  -     VAS carotid complete study; Future    Stroke-like symptoms  -     atorvastatin (LIPITOR) 20 mg tablet; Take 1 tablet (20 mg total) by mouth every evening          Subjective:      Patient ID: Malena Calabrese is a 56 y.o. male. Patient had a CV on 7/14/23. Pt c/o numbness on the Rt side of his neck at incision site. Pt denies trouble swallowing, chocking, or issues eating. Pt denies TIA or CVA symptoms. Pt smokes 1/2 ppd. Pt is on ASA 81 mg.     HPI    The following portions of the patient's history were reviewed and updated as appropriate: allergies, current medications, past family history, past medical history, past social history, past surgical history and problem list.    I have spent a total time of 30 minutes on 08/02/23 in caring for this patient including Diagnostic results, Prognosis, Risks and benefits of tx options, Instructions for management, Patient and family education, Importance of tx compliance, Risk factor reductions, Impressions, Counseling / Coordination of care, Documenting in the medical record and Reviewing / ordering tests, medicine, procedures  . Review of Systems   Constitutional: Negative. HENT: Negative. Eyes: Negative. Respiratory: Negative. Cardiovascular: Negative. Gastrointestinal: Negative. Endocrine: Negative. Genitourinary: Negative. Musculoskeletal: Negative. Skin: Negative. Allergic/Immunologic: Negative. Neurological: Positive for numbness. Negative for dizziness, facial asymmetry, speech difficulty and weakness. Psychiatric/Behavioral: Negative. Objective:      /88 (BP Location: Left arm, Patient Position: Sitting)   Pulse 63   Resp 20   Ht 5' 7" (1.702 m)   Wt 57.6 kg (127 lb)   SpO2 98%   BMI 19.89 kg/m²          Physical Exam  Constitutional:       Appearance: Normal appearance. HENT:      Head: Normocephalic and atraumatic. Eyes:      Extraocular Movements: Extraocular movements intact. Pupils: Pupils are equal, round, and reactive to light. Cardiovascular:      Rate and Rhythm: Normal rate and regular rhythm. Pulses:           Carotid pulses are 2+ on the right side and 2+ on the left side. Radial pulses are 2+ on the right side and 2+ on the left side. Pulmonary:      Effort: Pulmonary effort is normal.      Breath sounds: Normal breath sounds. Abdominal:      Palpations: Abdomen is soft. Tenderness: There is no abdominal tenderness. Musculoskeletal:         General: Normal range of motion. Cervical back: Normal range of motion. Neurological:      General: No focal deficit present. Mental Status: He is oriented to person, place, and time.    Psychiatric:         Mood and Affect: Mood normal.

## 2023-08-02 NOTE — ASSESSMENT & PLAN NOTE
61-year-old male status post excision of a right ICA aneurysm with end-to-end anastomosis on 4/7/2023    Patient's recent duplex shows normal velocities throughout the carotid artery. Patient is still active smoker. Patient has stopped taking Lipitor due to concern of the ingredients in the medication. Discussed with the patient in detail the safety and benefit of the medication is an active smoker is with arterial disease and I again highly recommend he continue taking Lipitor 20 mg.  I also recommend he continue with his aspirin 81 mg. He felt more comfortable after our discussion and has asked for a new prescription so he can resume taking it. We will have the patient follow-up in 6 months with a duplex at that time.

## 2023-09-17 NOTE — PROGRESS NOTES
ADULT ANNUAL PHYSICAL  [address] [facility]    NAME: Bo Bolanos  AGE: 56 y.o. SEX: male  : 1965     DATE: 2023     Assessment and Plan:     Problem List Items Addressed This Visit        Cardiovascular and Mediastinum    Carotid artery aneurysm (HCC)     Stable, followed by vascular surgery. Relevant Orders    Lipid panel    Comprehensive metabolic panel       Other    Mixed hyperlipidemia     Continues on Statin. Relevant Orders    Lipid panel    Comprehensive metabolic panel    Cigarette nicotine dependence without complication     Is down to 8 or 9 cigarettes a day. Other Visit Diagnoses     Annual physical exam    -  Primary    Screening for colon cancer        Relevant Orders    Ambulatory Referral to Gastroenterology          Immunizations and preventive care screenings were discussed with patient today. Appropriate education was printed on patient's after visit summary. Discussed risks and benefits of prostate cancer screening. We discussed the controversial history of PSA screening for prostate cancer in the [facility] as well as the risk of over detection and over treatment of prostate cancer by way of PSA screening. The patient understands that PSA blood testing is an imperfect way to screen for prostate cancer and that elevated PSA levels in the blood may also be caused by infection, inflammation, prostatic trauma or manipulation, urological procedures, or by benign prostatic enlargement. The role of the digital rectal examination in prostate cancer screening was also discussed and I discussed with him that there is large interobserver variability in the findings of digital rectal examination. Counseling:  Dental Health: discussed importance of regular tooth brushing, flossing, and dental visits. Depression Screening and Follow-up Plan: Patient assessed for underlying major depression.  Brief counseling provided and recommend additional follow-up/re-evaluation next office visit. Referral placed for gastroenterology for colonoscopy. Contact information provided for our [facility]. Screening labs ordered. BP borderline today on recheck. Return in about 1 year (around 9/18/2024). Chief Complaint:     Chief Complaint   Patient presents with   • Physical Exam   • [facility] referral      History of Present Illness:     Adult Annual Physical   Patient here for a comprehensive physical exam. The patient reports no acute issues, notes he needs to make a dentist appointment and that he would like a colonoscopy. Diet and Physical Activity  Diet/Nutrition: well balanced diet. Exercise: walking. Depression Screening  PHQ-2/9 Depression Screening         General Health  Sleep: sleeps well. Hearing: normal - bilateral.  Vision: no vision problems and wears glasses. Dental: brushes teeth twice daily.  Health  Symptoms include: none     Review of Systems:     Review of Systems   Constitutional: Negative. Respiratory: Negative. Cardiovascular: Negative. Neurological: Negative. All other systems reviewed and are negative. Past Medical History:     History reviewed. No pertinent past medical history. Past Surgical History:     Past Surgical History:   Procedure Laterality Date   • EVACUATION OF HEMATOMA Right 4/7/2023    Procedure: EVACUATION HEMATOMA;  Surgeon: Trinidad Cazares MD;  Location: [facility];  Service: Vascular   • EXPLORATION CAROTID ARTERY Right 4/7/2023    Procedure: Right Internal carotid aneurysm repair;  Surgeon: Trinidad Cazares MD;  [facility]: [facility];  Service: Vascular   • NECK EXPLORATION Right 4/7/2023    Procedure: EXPLORATION NECK;  Surgeon: Trinidad Cazares MD;  Location: [facility];  Service: Vascular      Family History:     History reviewed. No pertinent family history.    Social History: Social History     Socioeconomic History   • Marital status:      Spouse name: None   • Number of children: None   • Years of education: None   • Highest education level: None   Occupational History   • None   Tobacco Use   • Smoking status: Every Day     Packs/day: 1.00     Types: Cigarettes   • Smokeless tobacco: Never   Vaping Use   • Vaping Use: Never used   Substance and Sexual Activity   • Alcohol use: Not Currently   • Drug use: Yes     Types: Marijuana   • Sexual activity: Not Currently   Other Topics Concern   • None   Social History Narrative   • None     Social Determinants of Health     Financial Resource Strain: Not on file   Food Insecurity: Not on file   Transportation Needs: Not on file   Physical Activity: Not on file   Stress: Not on file   Social Connections: Not on file   Intimate Partner Violence: Not on file   Housing Stability: Not on file      Current Medications:     Current Outpatient Medications   Medication Sig Dispense Refill   • aspirin (ECOTRIN LOW STRENGTH) 81 mg EC tablet Take 1 tablet (81 mg total) by mouth daily Do not start before February 7, 2023. 30 tablet 0   • atorvastatin (LIPITOR) 20 mg tablet Take 1 tablet (20 mg total) by mouth every evening 30 tablet 2   • nicotine (NICODERM CQ) 14 mg/24hr TD 24 hr patch Place 1 patch on the skin over 24 hours every 24 hours 28 patch 0     No current facility-administered medications for this visit. Allergies: Allergies   Allergen Reactions   • Bee Venom Anaphylaxis   • Penicillins Anaphylaxis      Physical Exam:     /84 (BP Location: Left arm, Patient Position: Sitting, Cuff Size: Adult)   Pulse 70   Temp 97.6 °F (36.4 °C)   Ht 5' 7" (1.702 m)   Wt 57.7 kg (127 lb 3.2 oz)   SpO2 98%   BMI 19.92 kg/m²     Physical Exam  Constitutional:       Appearance: Normal appearance. HENT:      Head: Normocephalic and atraumatic. Cardiovascular:      Rate and Rhythm: Normal rate and regular rhythm.       Pulses: Dorsalis pedis pulses are 2+ on the right side and 2+ on the left side. Heart sounds: No murmur heard. No gallop. Pulmonary:      Effort: Pulmonary effort is normal. No respiratory distress. Breath sounds: Normal breath sounds. No wheezing or rales. Musculoskeletal:      Cervical back: Neck supple. Feet:      Right foot:      Skin integrity: No ulcer, skin breakdown, erythema, warmth, callus or dry skin. Left foot:      Skin integrity: No ulcer, skin breakdown, erythema, warmth, callus or dry skin. Neurological:      General: No focal deficit present. Mental Status: He is alert and oriented to person, place, and time. Mental status is at baseline. Psychiatric:         Mood and Affect: Mood normal.         Behavior: Behavior normal.         Thought Content:  Thought content normal.         Judgment: Judgment normal.          MELISSA Beauchamp  [facility]

## 2023-09-18 ENCOUNTER — OFFICE VISIT (OUTPATIENT)
Dept: FAMILY MEDICINE CLINIC | Facility: CLINIC | Age: 58
End: 2023-09-18
Payer: COMMERCIAL

## 2023-09-18 VITALS
OXYGEN SATURATION: 98 % | WEIGHT: 127.2 LBS | HEIGHT: 67 IN | HEART RATE: 70 BPM | TEMPERATURE: 97.6 F | DIASTOLIC BLOOD PRESSURE: 84 MMHG | BODY MASS INDEX: 19.97 KG/M2 | SYSTOLIC BLOOD PRESSURE: 140 MMHG

## 2023-09-18 DIAGNOSIS — Z00.00 ANNUAL PHYSICAL EXAM: Primary | ICD-10-CM

## 2023-09-18 DIAGNOSIS — Z12.11 SCREENING FOR COLON CANCER: ICD-10-CM

## 2023-09-18 DIAGNOSIS — E78.2 MIXED HYPERLIPIDEMIA: ICD-10-CM

## 2023-09-18 DIAGNOSIS — F17.210 CIGARETTE NICOTINE DEPENDENCE WITHOUT COMPLICATION: ICD-10-CM

## 2023-09-18 DIAGNOSIS — I72.0 CAROTID ARTERY ANEURYSM (HCC): ICD-10-CM

## 2023-09-18 PROBLEM — F19.90 DRUG USAGE: Status: RESOLVED | Noted: 2023-02-04 | Resolved: 2023-09-18

## 2023-09-18 PROCEDURE — 99396 PREV VISIT EST AGE 40-64: CPT | Performed by: NURSE PRACTITIONER

## 2023-11-07 DIAGNOSIS — R29.90 STROKE-LIKE SYMPTOMS: ICD-10-CM

## 2023-11-07 RX ORDER — ATORVASTATIN CALCIUM 20 MG/1
20 TABLET, FILM COATED ORAL EVERY EVENING
Qty: 30 TABLET | Refills: 0 | Status: SHIPPED | OUTPATIENT
Start: 2023-11-07

## 2023-11-29 DIAGNOSIS — R29.90 STROKE-LIKE SYMPTOMS: ICD-10-CM

## 2023-12-01 RX ORDER — ATORVASTATIN CALCIUM 20 MG/1
20 TABLET, FILM COATED ORAL EVERY EVENING
Qty: 30 TABLET | Refills: 2 | Status: SHIPPED | OUTPATIENT
Start: 2023-12-01

## 2023-12-06 ENCOUNTER — TELEPHONE (OUTPATIENT)
Age: 58
End: 2023-12-06

## 2023-12-06 ENCOUNTER — TELEPHONE (OUTPATIENT)
Dept: FAMILY MEDICINE CLINIC | Facility: CLINIC | Age: 58
End: 2023-12-06

## 2023-12-06 DIAGNOSIS — Z12.11 SCREENING FOR COLON CANCER: Primary | ICD-10-CM

## 2023-12-06 NOTE — TELEPHONE ENCOUNTER
Called patient to see if he was able to get his colonoscopy scheduled and patient stated that referral was no good. Needs new referral faxed to [phone] [facility].

## 2023-12-06 NOTE — TELEPHONE ENCOUNTER
Patient called and would like to have a colonoscopy referral faxed over to the [facility].
05:45

## 2023-12-06 NOTE — TELEPHONE ENCOUNTER
12/06/23  Screened by: Olivia Willson    Referring Provider PCP    Pre- Screening: There is no height or weight on file to calculate BMI. Has patient been referred for a routine screening Colonoscopy? yes  Is the patient between 43-73 years old? yes      Previous Colonoscopy YES    If yes:    Date: 20YRS     Facility: [facility]    Reason: SCREENING - FAM. HX      SCHEDULING STAFF: If the patient is between 45yrs-49yrs, please advise patient to confirm benefits/coverage with their insurance company for a routine screening colonoscopy, some insurance carriers will only cover at [address] [place] [address] or older. If the patient is over 66years old, please schedule an office visit. Does the patient want to see a Gastroenterologist prior to their procedure OR are they having any GI symptoms? YES    Has the patient been hospitalized or had abdominal surgery in the past 6 months? yes    Does the patient use supplemental oxygen? no    Does the patient take Coumadin, Lovenox, Plavix, Elliquis, Xarelto, or other blood thinning medication? no    Has the patient had a stroke, cardiac event, or stent placed in the past year? YES    FAILED OA    SCHEDULING STAFF: If patient answers NO to above questions, then schedule procedure. If patient answers YES to above questions, then schedule office appointment. If patient is between 45yrs - 49yrs, please advise patient that we will have to confirm benefits & coverage with their insurance company for a routine screening colonoscopy.

## 2024-02-02 ENCOUNTER — HOSPITAL ENCOUNTER (OUTPATIENT)
Dept: NON INVASIVE DIAGNOSTICS | Facility: HOSPITAL | Age: 59
Discharge: HOME | End: 2024-02-02
Attending: SURGERY
Payer: COMMERCIAL

## 2024-02-02 DIAGNOSIS — I72.0 CAROTID ARTERY ANEURYSM (HCC): ICD-10-CM

## 2024-02-02 PROCEDURE — 93880 EXTRACRANIAL BILAT STUDY: CPT

## 2024-02-03 PROCEDURE — 93880 EXTRACRANIAL BILAT STUDY: CPT | Performed by: SURGERY

## 2024-02-05 ENCOUNTER — TELEPHONE (OUTPATIENT)
Dept: VASCULAR SURGERY | Facility: CLINIC | Age: 59
End: 2024-02-05

## 2024-02-05 NOTE — TELEPHONE ENCOUNTER
Attempted to contact patient to schedule appointment(s) listed below.  Requested patient call [phone] option 3 to schedule appointment(s).    Patient's appointment(s) are due now.    Dopplers  [] Abdominal Aorta Iliac (AOIL)  [] Carotid (CV)   [] Celiac and/or Mesenteric  [] Endovascular Aortic Repair (EVAR)   [] Hemodialysis Access (HD)   [] Lower Limb Arterial (ROCKY)  [] Lower Limb Venous (LEV)  [] Lower Limb Venous Duplex with Reflux (LEVDR)  [] Renal Artery  [] Upper Limb Arterial (UEA)    [] Upper Limb Venous (UEV)              [] NICOLA and Waveform analysis     Advanced Imaging   [] CTA head/neck    [] CTA abdomen    [] CTA abdomen & pelvis    [] CT abdomen with/ without contrast  [] CT abdomen with contrast  [] CT abdomen without contrast    [] CT abdomen & pelvis with/ without contrast  [] CT abdomen & pelvis with contrast  [] CT abdomen & pelvis without contrast    Office Visit   [] New patient, patient last seen over 3 years ago  [] Risk factor modification (RFM)   [] Follow up   [] Lost to follow up (LTFU)   Called patient & LMOM to schedule 6 mo ov/RR CV 2/3/24

## 2024-02-18 DIAGNOSIS — R29.90 STROKE-LIKE SYMPTOMS: ICD-10-CM

## 2024-02-19 RX ORDER — ATORVASTATIN CALCIUM 20 MG/1
20 TABLET, FILM COATED ORAL EVERY EVENING
Qty: 30 TABLET | Refills: 0 | Status: SHIPPED | OUTPATIENT
Start: 2024-02-19

## 2024-02-19 NOTE — TELEPHONE ENCOUNTER
Will provide 30-day refill today, however further refills should be directed to patients PCP for long term management and monitoring. Thank you.

## 2024-06-20 ENCOUNTER — VBI (OUTPATIENT)
Dept: ADMINISTRATIVE | Facility: OTHER | Age: 59
End: 2024-06-20

## 2024-06-20 NOTE — TELEPHONE ENCOUNTER
06/20/24 11:52 AM     Chart reviewed for CRC: Colonoscopy ; nothing is submitted to the patient's insurance at this time.     ANAHY FERNANDEZ [facility]